# Patient Record
Sex: FEMALE | Race: WHITE
[De-identification: names, ages, dates, MRNs, and addresses within clinical notes are randomized per-mention and may not be internally consistent; named-entity substitution may affect disease eponyms.]

---

## 2017-07-26 ENCOUNTER — HOSPITAL ENCOUNTER (INPATIENT)
Dept: HOSPITAL 56 - MW.ED | Age: 52
LOS: 3 days | Discharge: HOME | DRG: 383 | End: 2017-07-29
Attending: INTERNAL MEDICINE | Admitting: INTERNAL MEDICINE
Payer: COMMERCIAL

## 2017-07-26 DIAGNOSIS — Z79.899: ICD-10-CM

## 2017-07-26 DIAGNOSIS — Z91.040: ICD-10-CM

## 2017-07-26 DIAGNOSIS — M06.9: ICD-10-CM

## 2017-07-26 DIAGNOSIS — M79.7: ICD-10-CM

## 2017-07-26 DIAGNOSIS — M20.11: ICD-10-CM

## 2017-07-26 DIAGNOSIS — I10: ICD-10-CM

## 2017-07-26 DIAGNOSIS — L03.116: Primary | ICD-10-CM

## 2017-07-26 DIAGNOSIS — Z88.8: ICD-10-CM

## 2017-07-26 DIAGNOSIS — Z72.0: ICD-10-CM

## 2017-07-26 DIAGNOSIS — M20.12: ICD-10-CM

## 2017-07-26 LAB
CHLORIDE SERPL-SCNC: 102 MMOL/L (ref 98–110)
SODIUM SERPL-SCNC: 138 MMOL/L (ref 136–146)

## 2017-07-26 PROCEDURE — A9577 INJ MULTIHANCE: HCPCS

## 2017-07-26 NOTE — CR
EXAMINATION: Left foot

 

HISTORY: Swelling

 

COMPARISON: 9/4/2012

 

TECHNIQUE: 2 views

 

FINDINGS: There is no acute osseous abnormality, dislocation, or fracture. There is an old healed se
cond metatarsal fracture identified. Moderate hallux valgus is noted. Tiny plantar calcaneal spur. P
unctate densities project over subcutaneous mild dorsal soft tissue swelling also noted overlying th
e forefoot. Plantar surface of the heel.

 

IMPRESSION: 

1. No acute osseous abdomen identified. Soft tissue swelling noted over the forefoot.

2. Hallux valgus.

## 2017-07-26 NOTE — PCM.HP
H&P History of Present Illness





- General


Date of Service: 07/26/17


Admit Problem/Dx: 


L foot cellulitis








- History of Present Illness


Initial Comments - Free Text/Narative: 





This 51 year old female with pmh of RA, HTN, and severe bunions to bilateral 

feet presented to the ED today with concerns of erythema to L foot which has 

worsened after being on both Keflex and Bactrim. She reports around July 7th, 

she started having some troubles with open skin to her bunions and callouses. 

She was seen by Betsy Beyer NP at [place] at that time and was placed on 

Keflex, nothing changed and she saw Betsy again on this last Monday. Podiatry 

was in the clinic at this time and was able to see her. Dr. Dixon debrided 

both bunions and callouses to bilateral feet. He then placed her on Bactrim 

BID. She is on day 3 of this and is noticing worsening redness and pain to L 

foot. She was urged to be seen in the ED due to failed outpatient treatment for 

cellulitis. She denies fevers or chills at home, no chest pain, SOB or 

palpitations. Some nausea with taking Bactrim. No abdominal pain, urinary 

symptoms or diarrhea.





In the ED no leukocytosis was noted. BMP WNL. ESR elevated at 57. L foot xray 

reveals soft tissue swelling over L forefoot. No acute osseous abnormality 

noted. Moderate hallux valgus noted. BC obtained and pending. She was treated 

with Vancomycin. She will be admitted for failed outpatient management of L 

foot cellulitis





PCP, Betsy Beyer NP





  ** Bilateral Feet


Pain Score (Numeric/FACES): 7





- Related Data


Allergies/Adverse Reactions: 


 Allergies











Allergy/AdvReac Type Severity Reaction Status Date / Time


 


erythromycin base Allergy  Hives Verified 07/26/17 11:57


 


latex Allergy  Rash Verified 07/26/17 11:57


 


Clear Medical Tape Allergy  Rash Uncoded 07/26/17 11:57











Home Medications: 


 Home Meds





Celecoxib [CeleBREX] 200 mg PO DAILY 01/14/17 [History]


Cyclobenzaprine [Flexeril] 20 mg PO BID 01/14/17 [History]


Omeprazole 20 mg PO TID 01/14/17 [History]


Pregabalin [Lyrica] 200 mg PO TID 01/14/17 [History]


amLODIPine [Norvasc] 5 mg PO DAILY 01/14/17 [History]


Sulfamethoxazole/Trimethoprim [Septra DS] 800 mg PO BID 07/26/17 [History]











Past Medical History


HEENT History: Reports: None


Cardiovascular History: Reports: Hypertension, Other (See Below) (pericardial 

effusion, which was drained approx 2009.).  Denies: Afib, Blood Clots/VTE/DVT, 

MI


Respiratory History: Reports: None.  Denies: COPD, SOB


Gastrointestinal History: Reports: GERD.  Denies: GI Bleed


Genitourinary History: Reports: None.  Denies: Acute Renal Failure, Chronic 

Renal Insuffiency


OB/GYN History: Reports: None


Musculoskeletal History: Reports: Fibromyalgia, RA


Neurological History: Reports: None


Psychiatric History: Reports: None


Endocrine/Metabolic History: Reports: None


Hematologic History: Reports: None


Immunologic History: Reports: None


Oncologic (Cancer) History: Reports: None


Dermatologic History: Reports: None





- Infectious Disease History


Infectious Disease History: Reports: Chicken Pox





- Past Surgical History


Neurological Surgical History: Reports: Other (See Below)


Musculoskeletal Surgical History: Reports: Other (See Below)





Social & Family History





- Family History


Family Medical History: Noncontributory





- Tobacco Use


Smoking Status *Q: Current Every Day Smoker


Years of Tobacco use: 20


Packs/Tins Daily: 1


Used Tobacco, but Quit: No


Second Hand Smoke Exposure: No





- Caffeine Use


Caffeine Use: Reports: Coffee





- Alcohol Use


Number of Drinks Per Day: 3


Alcohol Use Frequency: Daily





- Recreational Drug Use


Recreational Drug Use: No





- Living Situation & Occupation


Living situation: Reports: 


Occupation: Employed





H&P Review of Systems





- Review of Systems:


Review Of Systems: See Below


General: Reports: No Symptoms.  Denies: Fever, Chills, Malaise


HEENT: Reports: No Symptoms.  Denies: Headaches, Sinus Congestion, Sore Throat


Pulmonary: Reports: No Symptoms.  Denies: Shortness of Breath, Cough, Sputum


Cardiovascular: Reports: Edema (bilateral feet).  Denies: Chest Pain, 

Palpitations


Gastrointestinal: Reports: Nausea (with Bactrim dosing).  Denies: Abdominal Pain

, Black Stool, Bloody Stool, Vomiting


Genitourinary: Reports: No Symptoms.  Denies: Dysuria, Frequency, Burning


Musculoskeletal: Reports: Foot Pain (L & R feet and bunion pain)


Skin: Reports: Erythema (L foot), Wound


Psychiatric: Reports: No Symptoms


Neurological: Reports: No Symptoms





Exam





- Exam


Exam: See Below





- Vital Signs


Vital Signs: 


 Last Vital Signs











Temp  96.3 F   07/26/17 12:00


 


Pulse  96   07/26/17 12:00


 


Resp  18   07/26/17 12:00


 


BP  132/83   07/26/17 12:00


 


Pulse Ox  98   07/26/17 12:00











Weight: 86.3 kg





- Exam


General: Alert, Oriented, Cooperative


HEENT: Conjunctiva Clear, Hearing Intact, Mucosa Moist & Pink, Posterior 

Pharynx Clear, Pupils Equal, Pupils Reactive, PERRLA


Neck: Supple, Trachea Midline, 2


Lungs: Clear to Auscultation, Normal Respiratory Effort


Cardiovascular: Regular Rate, Regular Rhythm, Normal S1, Normal S2


GI/Abdominal Exam: Normal Bowel Sounds, Soft, Non-Tender, No Organomegaly, No 

Distention, No Abnormal Bruit, No Mass, Pelvis Stable


Extremities: Normal Range of Motion, Normal Capillary Refill, Pedal Edema, 

Joint Swelling, Other (bunions noted to both L and R feet, with open skit to 

both. No purulent drainage, both wound bend clean. Some dry flaky skin noted 

surround these areas andfurther callouses. Erythem extends from L bunion up to 

dorsum and medial malleous to ventral shin. Warm and tenderness noted to any 

palpation. No fluctuance noted.)


Skin: Wound (See above description)


Neurological: Cranial Nerves Intact


Neuro Extensive - Mental Status: Alert, Oriented x3, Normal Mood/Affect, Normal 

Cognition, Memory Intact


Psychiatric: Alert, Normal Affect, Normal Mood





- Patient Data


Result Diagrams: 


 07/26/17 12:18





 07/26/17 12:18





*Q Meaningful Use (ADM)





- VTE *Q


VTE Criteria *Q: 








- VTE Risk Assess *Q


Each Risk Factor Represents 1 Point: Age 41 - 59 years, Swollen Legs, Current


Total Score 1 Point Risk Factors: 2


Each Risk Factor Represents 2 Points: None


Total Score 2 Point Risk Factors: 0


Each Risk Factor Represents 3 Points: None


Total Score 3 Point Risk Factors: 0


Each Risk Factor Represents 5 Points: None


Total Score 5 Point Risk Factors: 0


Venous Thromboembolism Risk Factor Score *Q: 2





- Stroke *Q


Stroke Criteria *Q: 








- AMI *Q


AMI Criteria *Q: 








- Problem List


(1) Cellulitis


SNOMED Code(s): 375938937


   ICD Code: L03.90 - CELLULITIS, UNSPECIFIED   Status: Acute   Current Visit: 

Yes   


Qualifiers: 


   Site of cellulitis: extremity   Site of cellulitis of extremity: lower 

extremity   Laterality: left   Qualified Code(s): L03.116 - Cellulitis of left 

lower limb   





(2) RA (rheumatoid arthritis)


SNOMED Code(s): 71130276


   ICD Code: M06.9 - RHEUMATOID ARTHRITIS, UNSPECIFIED   Status: Chronic   

Current Visit: Yes   


Qualifiers: 


   Rheumatoid arthritis location: wrist   Laterality: bilateral 





(3) Fibromyalgia


SNOMED Code(s): 494876638


   ICD Code: M79.7 - FIBROMYALGIA   Status: Chronic   Current Visit: Yes   





(4) Hallux valgus (acquired), left foot


SNOMED Code(s): 18713554


   ICD Code: M20.12 - HALLUX VALGUS (ACQUIRED), LEFT FOOT   Status: Chronic   

Current Visit: Yes   





(5) Hallux valgus (acquired), right foot


SNOMED Code(s): 82848785


   ICD Code: M20.11 - HALLUX VALGUS (ACQUIRED), RIGHT FOOT   Status: Chronic   

Current Visit: Yes   





(6) HTN (hypertension)


SNOMED Code(s): 88482455


   ICD Code: I10 - ESSENTIAL (PRIMARY) HYPERTENSION   Status: Chronic   Current 

Visit: Yes   


Qualifiers: 


   Hypertension type: essential hypertension   Qualified Code(s): I10 - 

Essential (primary) hypertension   





(7) Nicotine abuse


SNOMED Code(s): 76852270


   ICD Code: Z72.0 - TOBACCO USE   Status: Chronic   Current Visit: Yes   


Problem List Initiated/Reviewed/Updated: Yes


Orders Last 24hrs: 


 Active Orders 24 hr











 Category Date Time Status


 


 Ambulate [RC] ASDIRECTED Care  07/26/17 13:48 Ordered


 


 Elevate Extremity [RC] BID Care  07/26/17 13:48 Ordered


 


 Oxygen Therapy [RC] PRN Care  07/26/17 13:48 Ordered


 


 VTE/DVT Education [RC] PER UNIT ROUTINE Care  07/26/17 13:48 Ordered


 


 Vital Signs [RC] Q4H Care  07/26/17 13:48 Ordered


 


 Wound Care [RC] DAILY Care  07/26/17 13:48 Ordered


 


 Regular Diet [DIET] Diet  07/26/17 Dinner Ordered


 


 BASIC METABOLIC PANEL,BMP [CHEM] AM Lab  07/27/17 05:11 Ordered


 


 BASIC METABOLIC PANEL,BMP [CHEM] AM Lab  07/28/17 05:11 Ordered


 


 BASIC METABOLIC PANEL,BMP [CHEM] AM Lab  07/29/17 05:11 Ordered


 


 CBC WITH AUTO DIFF [HEME] AM Lab  07/27/17 05:11 Ordered


 


 CBC WITH AUTO DIFF [HEME] AM Lab  07/28/17 05:11 Ordered


 


 CBC WITH AUTO DIFF [HEME] AM Lab  07/29/17 05:11 Ordered


 


 Acetaminophen [Tylenol] Med  07/26/17 13:48 Ordered





 650 mg PO Q4H PRN   


 


 Cyclobenzaprine [Flexeril] Med  07/26/17 21:00 Ordered





 20 mg PO BID   


 


 Enoxaparin [Lovenox] Med  07/26/17 14:00 Ordered





 40 mg SUBCUT DAILY   


 


 Omeprazole [Omeprazole] Med  07/26/17 14:00 Ordered





 20 mg PO TID   


 


 Ondansetron [Zofran] Med  07/26/17 13:48 Ordered





 4 mg IVPUSH Q4H PRN   


 


 Pregabalin [Lyrica] Med  07/26/17 14:00 Ordered





 200 mg PO TID   


 


 Vancomycin Pharmacy to Dose [Pharmacy to Dose - Med  07/26/17 14:00 Ordered





 Vancomycin]   





 1 dose .XX ASDIRECTED   


 


 amLODIPine [Norvasc] Med  07/27/17 09:00 Ordered





 5 mg PO DAILY   


 


 oxyCODONE Med  07/26/17 13:48 Ordered





 5 mg PO Q4H PRN   


 


 Resuscitation Status Routine Resus Stat  07/26/17 13:48 Ordered








 Medication Orders





Sodium Chloride (Normal Saline)  1,000 mls @ 125 mls/hr IV STAT PILO


   Last Admin: 07/26/17 12:38  Dose: 125 mls/hr








Assessment/Plan Comment:: 


This 51 year old female admitted with L foot cellulitis, which failed 

outpatient treatment





1. Cellulitis: Will place on Vancomycin. Continue betadine cleanse and gauze 

wraps per Podiatry. Monitor closely. ESR elevated. No drainage. BC pending. 

Will order Oxycodone for pain PRN





2. Open wounds: Continue betadine and gauze wrap to bilateral bunions as per 

Podiatry. 





3. HTN: Continue Norvasc





4. Fibromyalgia: Continue Flexeril and Gabapentin





VTE prophylaxis: Lovenox





Dispo: 2-3 days.

## 2017-07-26 NOTE — EDM.PDOC
ED HPI GENERAL MEDICAL PROBLEM





- General


Chief Complaint: Skin Complaint


Stated Complaint: INFECTION IN FEET


Time Seen by Provider: 07/26/17 12:01


Source of Information: Reports: Patient





- History of Present Illness


INITIAL COMMENTS - FREE TEXT/NARRATIVE: 





HISTORY AND PHYSICAL:


History of present illness:


Patient presents from [facility]  or nurse practitioner Kayleen, 

she is seeing podiatry through [facility] as well for diabetic foot 

ulcer on her left foot, she has been on Bactrim day 3 of 10 with improvement of 

her diabetic foot ulcer however she has expanding cellulitis failing oral 

antibiotics





Patient course started on July 7, she has had several visits with podiatry as 

well as Kayleen, is initially on Keflex, currently on Bactrim with worsening of 

symptoms





No fever nausea vomiting chills sweats














Review of systems: 


As per history of present illness and below otherwise all systems reviewed and 

negative.


Past medical history: 


As per history of present illness and as reviewed below otherwise 

noncontributory.


Surgical history: 


As per history of present illness and as reviewed below otherwise 

noncontributory.


Social history: 


No reported history of drug or alcohol abuse.


Family history: 


As per history of present illness and as reviewed below otherwise 

noncontributory.








Physical exam:


HEENT: Atraumatic, normocephalic, pupils reactive, negative for conjunctival 

pallor or scleral icterus, mucous membranes moist, throat clear, neck supple, 

nontender, trachea midline.


Lungs: Clear to auscultation, breath sounds equal bilaterally, chest nontender.


Heart: S1S2, regular, negative for clicks, rubs, or JVD.


Abdomen: Soft, nondistended, nontender. Negative for masses or 

hepatosplenomegaly. Negative for costovertebral tenderness.


Pelvis: Stable nontender.


Genitourinary: Deferred.


Rectal: Deferred.


Extremities: Atraumatic, negative for cords or calf pain. Neurovascular 

unremarkable.


Neuro: Awake, alert, oriented. Cranial nerves II through XII unremarkable. 

Cerebellum unremarkable. Motor and sensory unremarkable throughout. Exam 

nonfocal.


Left foot redness warmth and tenderness over the entire dorsum of the foot 

extending medially just over the ankle, healing diabetic foot ulcer noted no 

exudate for culture








Diagnostics:


[]CBC, CMP, UA


Blood culture 2





Therapeutics:


[]Vancomycin 1 g IV








Impression: 


[]Diabetic foot ulcer


Cellulitis left foot


Failed outpatient antibiotics








Definitive disposition and diagnosis as appropriate pending reevaluation and 

review of above.


  ** Bilateral Feet


Pain Score (Numeric/FACES): 7





- Related Data


 Allergies











Allergy/AdvReac Type Severity Reaction Status Date / Time


 


erythromycin base Allergy  Hives Verified 07/26/17 11:57


 


latex Allergy  Rash Verified 07/26/17 11:57


 


Clear Medical Tape Allergy  Rash Uncoded 07/26/17 11:57











Home Meds: 


 Home Meds





Celecoxib [CeleBREX] 200 mg PO DAILY 01/14/17 [History]


Cyclobenzaprine [Flexeril] 20 mg PO BID 01/14/17 [History]


Omeprazole 20 mg PO TID 01/14/17 [History]


Pregabalin [Lyrica] 200 mg PO TID 01/14/17 [History]


amLODIPine [Norvasc] 5 mg PO DAILY 01/14/17 [History]


Sulfamethoxazole/Trimethoprim [Septra DS] 800 mg PO BID 07/26/17 [History]











Past Medical History


HEENT History: Reports: None


Cardiovascular History: Reports: Hypertension


Respiratory History: Reports: None


Gastrointestinal History: Reports: None


Genitourinary History: Reports: None


OB/GYN History: Reports: None


Musculoskeletal History: Reports: Fibromyalgia, RA


Neurological History: Reports: None


Psychiatric History: Reports: None


Endocrine/Metabolic History: Reports: None


Hematologic History: Reports: None


Immunologic History: Reports: None


Oncologic (Cancer) History: Reports: None


Dermatologic History: Reports: None





- Infectious Disease History


Infectious Disease History: Reports: Chicken Pox





- Past Surgical History


Neurological Surgical History: Reports: Other (See Below)


Musculoskeletal Surgical History: Reports: Other (See Below)





Social & Family History





- Family History


Family Medical History: Noncontributory





- Tobacco Use


Smoking Status *Q: Current Every Day Smoker


Years of Tobacco use: 20


Packs/Tins Daily: 1





- Caffeine Use


Caffeine Use: Reports: Coffee





- Recreational Drug Use


Recreational Drug Use: No





ED ROS GENERAL





- Review of Systems


Review Of Systems: ROS reveals no pertinent complaints other than HPI.





ED EXAM, SKIN/RASH


Exam: See Below





Course





- Vital Signs


Last Recorded V/S: 


 Last Vital Signs











Temp  35.7 C   07/26/17 12:00


 


Pulse  96   07/26/17 12:00


 


Resp  18   07/26/17 12:00


 


BP  132/83   07/26/17 12:00


 


Pulse Ox  98   07/26/17 12:00














- Orders/Labs/Meds


Orders: 


 Active Orders 24 hr











 Category Date Time Status


 


 Foot 2V Rt [CR] Stat Exams  07/26/17 13:01 Ordered


 


 CULTURE BLOOD [BC] Stat Lab  07/26/17 12:18 Received


 


 CULTURE BLOOD [BC] Stat Lab  07/26/17 12:35 Received


 


 SEDIMENTATION RATE AUTO [HEME] Stat Lab  07/26/17 12:18 Received


 


 UA W/MICROSCOPIC [URIN] Stat Lab  07/26/17 12:00 Uncollected


 


 Sodium Chloride 0.9% [Normal Saline] 1,000 ml Med  07/26/17 12:15 Active





 IV STAT   


 


 Blood Culture x2 Reflex Set [OM.PC] Stat Oth  07/26/17 12:10 Ordered








 Medication Orders





Sodium Chloride (Normal Saline)  1,000 mls @ 125 mls/hr IV STAT PILO


   Last Admin: 07/26/17 12:38  Dose: 125 mls/hr








Labs: 


 Laboratory Tests











  07/26/17 07/26/17 Range/Units





  12:18 12:18 


 


WBC  6.34   (4.0-11.0)  K/uL


 


RBC  4.20 L   (4.30-5.90)  M/uL


 


Hgb  13.5   (12.0-16.0)  g/dL


 


Hct  39.9   (36.0-46.0)  %


 


MCV  95.0   (80.0-98.0)  fL


 


MCH  32.1 H   (27.0-32.0)  pg


 


MCHC  33.8   (31.0-37.0)  g/dL


 


RDW Std Deviation  48.6   (28.0-62.0)  fl


 


RDW Coeff of Inocente  14   (11.0-15.0)  %


 


Plt Count  211   (150-400)  K/uL


 


MPV  10.30   (7.40-12.00)  fL


 


Neut % (Auto)  65.8   (48.0-80.0)  %


 


Lymph % (Auto)  25.1   (16.0-40.0)  %


 


Mono % (Auto)  6.3   (0.0-15.0)  %


 


Eos % (Auto)  2.5   (0.0-7.0)  %


 


Baso % (Auto)  0.3   (0.0-1.5)  %


 


Neut # (Auto)  4.2   (1.4-5.7)  K/uL


 


Lymph # (Auto)  1.6   (0.6-2.4)  K/uL


 


Mono # (Auto)  0.4   (0.0-0.8)  K/uL


 


Eos # (Auto)  0.2   (0.0-0.7)  K/uL


 


Baso # (Auto)  0.0   (0.0-0.1)  K/uL


 


Nucleated RBC %  0.0   /100WBC


 


Nucleated RBCs #  0   K/uL


 


Sodium   138  (136-146)  mmol/L


 


Potassium   4.0  (3.5-5.1)  mmol/L


 


Chloride   102  ()  mmol/L


 


Carbon Dioxide   25  (21-31)  mmol/L


 


BUN   15  (6.0-23.0)  mg/dL


 


Creatinine   0.9  (0.6-1.5)  mg/dL


 


Est Cr Clr Drug Dosing   70.30  mL/min


 


Estimated GFR (MDRD)   > 60.0  ml/min


 


Glucose   93  ()  mg/dL


 


Calcium   9.6  (8.8-10.8)  mg/dL


 


Total Bilirubin   0.5  (0.1-1.5)  mg/dL


 


AST   29  (5-40)  IU/L


 


ALT   30  (8-54)  IU/L


 


Alkaline Phosphatase   88  ()  


 


Total Protein   8.1 H  (6.0-8.0)  g/dL


 


Albumin   4.2  (3.5-5.0)  g/dL


 


Globulin   3.9 H  (2.0-3.5)  g/dL


 


Albumin/Globulin Ratio   1.1 L  (1.3-2.8)  











Meds: 


Medications











Generic Name Dose Route Start Last Admin





  Trade Name Freq  PRN Reason Stop Dose Admin


 


Sodium Chloride  1,000 mls @ 125 mls/hr  07/26/17 12:15  07/26/17 12:38





  Normal Saline  IV   125 mls/hr





  STAT PILO   Administration














Discontinued Medications














Generic Name Dose Route Start Last Admin





  Trade Name Freq  PRN Reason Stop Dose Admin


 


Vancomycin HCl 1 gm/ Sodium  250 mls @ 250 mls/hr  07/26/17 12:11  07/26/17 12:

39





  Chloride  IV  07/26/17 13:10  250 mls/hr





  ONETIME ONE   Administration














Departure





- Departure


Time of Disposition: 13:13


Disposition: Admitted As Inpatient 64


Condition: Fair


Clinical Impression: 


 Cellulitis, Diabetic foot ulcer








- Discharge Information


Forms:  ED Department Discharge





- My Orders


Last 24 Hours: 


My Active Orders





07/26/17 12:00


UA W/MICROSCOPIC [URIN] Stat 





07/26/17 12:10


Blood Culture x2 Reflex Set [OM.PC] Stat 





07/26/17 12:15


Sodium Chloride 0.9% [Normal Saline] 1,000 ml IV STAT 





07/26/17 12:18


CULTURE BLOOD [BC] Stat 


SEDIMENTATION RATE AUTO [HEME] Stat 





07/26/17 12:35


CULTURE BLOOD [BC] Stat 





07/26/17 13:01


Foot 2V Rt [CR] Stat 














- Assessment/Plan


Last 24 Hours: 


My Active Orders





07/26/17 12:00


UA W/MICROSCOPIC [URIN] Stat 





07/26/17 12:10


Blood Culture x2 Reflex Set [OM.PC] Stat 





07/26/17 12:15


Sodium Chloride 0.9% [Normal Saline] 1,000 ml IV STAT 





07/26/17 12:18


CULTURE BLOOD [BC] Stat 


SEDIMENTATION RATE AUTO [HEME] Stat 





07/26/17 12:35


CULTURE BLOOD [BC] Stat 





07/26/17 13:01


Foot 2V Rt [CR] Stat

## 2017-07-27 LAB
CHLORIDE SERPL-SCNC: 107 MMOL/L (ref 98–110)
SODIUM SERPL-SCNC: 140 MMOL/L (ref 136–146)

## 2017-07-27 NOTE — MR
EXAM DATE: 17



PATIENT'S AGE: 51





Patient: SHOBHA MOORE



Facility: [place], [place]

Patient ID: [id number]

: 1965

Study: MRI Extremity Left [id number]/26/2017 8:47:56 PM

Ordering Physician: Varsha Augustin



Final Report: 

HISTORY:

Left foot cellulitis. Evaluate for osteomyelitis.



Technique:

Axial, sagittal and coronal T1, proton density, proton density fat-sat, STIR 
and post contrast T1 weighted images with fat saturation were obtained of the 
left foot.



Comparison:

No prior.



Findings:

Tendons: Small amount of fluid within the posterior tibial tendon sheath. The 
tendon itself is intact. The flexor digitorum longus and flexor hallucis longus 
tendons are intact. There is a small amount of fluid within the extensor 
digitorum longus tendon sheath suggesting mild tenosynovitis. The anterior 
extensor tendons appear otherwise intact. Areas longus and brevis tendons are 
intact. The distal Achilles tendon is intact.

-

Ligaments: The anterior and posterior syndesmotic ligaments are intact. Deltoid 
ligament is intact. Lateral ankle ligamentous structures are intact. Sinus 
tarsi fat is maintained. Calcaneonavicular spring ligament intact.

-

Joint spaces: The ankle and subtalar joint spaces are maintained. Talonavicular 
and calcaneocuboid articulations are maintained. Joint spaces within the 
midfoot and at the midfoot-forefoot junction are maintained. Degenerative 
changes of the 1st metatarsophalangeal joint. The 2nd through 5th 
metatarsophalangeal joints are maintained. 

-

Bones and soft tissues: There is subcutaneous signal abnormality enhancement 
involving the distal lower leg and foot compatible with cellulitis. No 
localized fluid collection to suggest a discrete soft tissue abscess. No 
definite osteomyelitis. A bipartite medial sesamoid bone is present with the 
marrow edema present within the sesamoid. There is no acute fracture. Small 
plantar calcaneal spur. Mild more chronic appearing thickening of the proximal 
most central band of the plantar fascia.



Impression:

1. Cellulitis of the left lower leg and foot. No localized fluid collection or 
osteomyelitis.

2. Degenerative changes of the 1st metatarsophalangeal joint.

3. Bipartite medial sesamoid bone with marrow edema within both moieties.

4. Extensor digitorum longus tendon sheath tenosynovitis. The tendons appear 
otherwise intact.

5. Ankle joint space and ligaments are intact.

6. Small plantar calcaneal spur with mild chronic thickening of the proximal 
central band of the plantar fascia.





Dictated by Sahil Carty MD @ 2017 7:59AM

(Electronic Signature)



Report Signed by Proxy.
ALEXANDRA

## 2017-07-27 NOTE — PCM.PN
- General Info


Date of Service: 07/27/17


Admission Dx/Problem (Free Text): 


L foot cellulitis





Subjective Update: 


Continues to have throbbing pain to bilateral botttoms of feet. Denies chest 

pain or SOB. erythema improving.





Functional Status: Reports: Pain Controlled, Tolerating Diet, Ambulating, 

Urinating





- Review of Systems


General: Reports: No Symptoms.  Denies: Fever


HEENT: Reports: No Symptoms


Pulmonary: Reports: No Symptoms.  Denies: Shortness of Breath


Cardiovascular: Reports: No Symptoms.  Denies: Chest Pain


Gastrointestinal: Reports: No Symptoms.  Denies: Abdominal Pain, Nausea, 

Vomiting


Genitourinary: Reports: No Symptoms.  Denies: Dysuria, Frequency, Burning, Pain


Musculoskeletal: Reports: No Symptoms


Skin: Reports: No Symptoms


Neurological: Reports: No Symptoms


Psychiatric: Reports: No Symptoms





- Patient Data


Vitals - Most Recent: 


 Last Vital Signs











Temp  97.1 F   07/27/17 08:00


 


Pulse  76   07/27/17 08:00


 


Resp  22 H  07/27/17 08:00


 


BP  99/62   07/27/17 08:00


 


Pulse Ox  93 L  07/27/17 08:00











Weight - Most Recent: 86.3 kg


I&O - Last 24 Hours: 


 Intake & Output











 07/26/17 07/27/17 07/27/17





 22:59 06:59 14:59


 


Intake Total 1000 2400 


 


Output Total  2500 


 


Balance 1000 -100 











Lab Results Last 24 Hours: 


 Laboratory Results - last 24 hr











  07/26/17 07/27/17 07/27/17 Range/Units





  15:40 04:40 04:40 


 


WBC   4.34   (4.0-11.0)  K/uL


 


RBC   3.96 L   (4.30-5.90)  M/uL


 


Hgb   12.5   (12.0-16.0)  g/dL


 


Hct   38.3   (36.0-46.0)  %


 


MCV   96.7   (80.0-98.0)  fL


 


MCH   31.6   (27.0-32.0)  pg


 


MCHC   32.6   (31.0-37.0)  g/dL


 


RDW Std Deviation   50.0   (28.0-62.0)  fl


 


RDW Coeff of Inocente   14   (11.0-15.0)  %


 


Plt Count   192   (150-400)  K/uL


 


MPV   9.80   (7.40-12.00)  fL


 


Neut % (Auto)   51.0   (48.0-80.0)  %


 


Lymph % (Auto)   34.3   (16.0-40.0)  %


 


Mono % (Auto)   9.9   (0.0-15.0)  %


 


Eos % (Auto)   4.1   (0.0-7.0)  %


 


Baso % (Auto)   0.7   (0.0-1.5)  %


 


Neut # (Auto)   2.2   (1.4-5.7)  K/uL


 


Lymph # (Auto)   1.5   (0.6-2.4)  K/uL


 


Mono # (Auto)   0.4   (0.0-0.8)  K/uL


 


Eos # (Auto)   0.2   (0.0-0.7)  K/uL


 


Baso # (Auto)   0.0   (0.0-0.1)  K/uL


 


Nucleated RBC %   0.0   /100WBC


 


Nucleated RBCs #   0   K/uL


 


Sodium    140  (136-146)  mmol/L


 


Potassium    4.4  (3.5-5.1)  mmol/L


 


Chloride    107  ()  mmol/L


 


Carbon Dioxide    25  (21-31)  mmol/L


 


BUN    15  (6.0-23.0)  mg/dL


 


Creatinine    0.8  (0.6-1.5)  mg/dL


 


Est Cr Clr Drug Dosing    79.08  mL/min


 


Estimated GFR (MDRD)    > 60.0  ml/min


 


Glucose    86  ()  mg/dL


 


Calcium    8.9  (8.8-10.8)  mg/dL


 


Urine Color  YELLOW    


 


Urine Appearance  CLEAR    


 


Urine pH  6.5    (5.0-8.0)  


 


Ur Specific Gravity  <= 1.005    (1.001-1.035)  


 


Urine Protein  NEGATIVE    (NEGATIVE)  mg/dL


 


Urine Glucose (UA)  NEGATIVE    (NEGATIVE)  mg/dL


 


Urine Ketones  NEGATIVE    (NEGATIVE)  mg/dL


 


Urine Occult Blood  NEGATIVE    (NEGATIVE)  


 


Urine Nitrite  NEGATIVE    (NEGATIVE)  


 


Urine Bilirubin  NEGATIVE    (NEGATIVE)  


 


Urine Urobilinogen  0.2    (<2.0)  EU/dL


 


Ur Leukocyte Esterase  NEGATIVE    (NEGATIVE)  


 


Urine RBC  0-1    (0-2/HPF)  


 


Urine WBC  0-1    (0-5/HPF)  


 


Ur Epithelial Cells  RARE    (NONE-FEW)  


 


Urine Bacteria  RARE    (NEGATIVE)  











Med Orders - Current: 


 Current Medications





Acetaminophen (Tylenol)  650 mg PO Q4H PRN


   PRN Reason: Pain


Amlodipine Besylate (Norvasc)  5 mg PO DAILY [facility]


Cyclobenzaprine HCl (Flexeril)  20 mg PO BID [facility]


   Last Admin: 07/26/17 21:09 Dose:  20 mg


Docusate Sodium (Colace)  100 mg PO DAILY [facility]


   Last Admin: 07/26/17 15:08 Dose:  100 mg


Enoxaparin Sodium (Lovenox)  40 mg SUBCUT DAILY [facility]


   Last Admin: 07/26/17 14:22 Dose:  40 mg


Vancomycin HCl 1,250 mg/ (Sodium Chloride)  250 mls @ 250 mls/hr IV Q12H [facility]


   Last Admin: 07/26/17 23:15 Dose:  250 mls/hr


Morphine Sulfate (Morphine)  3 mg IVPUSH Q4H PRN


   PRN Reason: Pain


   Last Admin: 07/27/17 04:09 Dose:  3 mg


Nicotine (Habitrol)  14 mg TRDERM DAILY [facility]


   Last Admin: 07/26/17 14:22 Dose:  14 mg


Ondansetron HCl (Zofran)  4 mg IVPUSH Q4H PRN


   PRN Reason: Nausea


Oxycodone HCl (Oxycodone)  5 - 10 mg PO Q4H PRN


   PRN Reason: Pain (moderate 4-6)


   Last Admin: 07/27/17 08:04 Dose:  10 mg


Pantoprazole Sodium (Protonix***)  40 mg PO BIDAC [facility]


   Last Admin: 07/27/17 06:31 Dose:  40 mg


Polyethylene Glycol (Miralax)  17 gm PO DAILY [facility]


   Last Admin: 07/26/17 15:08 Dose:  17 gm


Pregabalin (Lyrica)  200 mg PO TID [facility]


   Last Admin: 07/27/17 06:31 Dose:  200 mg


Vancomycin HCl (Pharmacy To Dose - Vancomycin)  1 dose .XX ASDIRECTED [facility]





Discontinued Medications





Gadobenate Dimeglumine (Multihance)  20 ml IVPUSH ONETIME STA


   Stop: 07/26/17 19:37


   Last Admin: 07/26/17 19:37 Dose:  16 ml


Sodium Chloride (Normal Saline)  1,000 mls @ 125 mls/hr IV STAT [facility]


   Last Admin: 07/26/17 12:38 Dose:  125 mls/hr


Vancomycin HCl 1 gm/ Sodium (Chloride)  250 mls @ 250 mls/hr IV ONETIME ONE


   Stop: 07/26/17 13:10


   Last Admin: 07/26/17 12:39 Dose:  250 mls/hr


Vancomycin HCl 1,500 mg/ (Sodium Chloride)  500 mls @ 333.333 mls/hr IV Q12H [facility]


Vancomycin HCl 1,250 mg/ (Sodium Chloride)  250 mls @ 250 mls/hr IV Q12H [facility]


Omeprazole (Omeprazole)  20 mg PO TID [facility]


   Last Admin: 07/26/17 14:24 Dose:  20 mg


Oxycodone HCl (Oxycodone)  5 mg PO Q4H PRN


   PRN Reason: Pain (moderate 4-6)


   Last Admin: 07/26/17 14:23 Dose:  5 mg











- Exam


General: Alert, Oriented, Cooperative, No Acute Distress


Neck: Supple


Lungs: Clear to Auscultation, Normal Respiratory Effort


Cardiovascular: Regular Rate, Regular Rhythm


Extremities: Normal Inspection, Normal Capillary Refill, Pedal Edema (improving 

with elevation of limbs)


Wound/Incisions: Erythema Improving (to L medial ankle and dorsum of foot. Open 

fissures noted to bilateral bunion and callouses. no drainage, )


Psy/Mental Status: Alert, Normal Affect, Normal Mood





- Problem List & Annotations


(1) Cellulitis


SNOMED Code(s): 178689763


   Code(s): L03.90 - CELLULITIS, UNSPECIFIED   Status: Acute   Current Visit: 

Yes   


Qualifiers: 


   Site of cellulitis: extremity   Site of cellulitis of extremity: lower 

extremity   Laterality: left   Qualified Code(s): L03.116 - Cellulitis of left 

lower limb   





(2) RA (rheumatoid arthritis)


SNOMED Code(s): 90730645


   Code(s): M06.9 - RHEUMATOID ARTHRITIS, UNSPECIFIED   Status: Chronic   

Current Visit: Yes   


Qualifiers: 


   Rheumatoid arthritis location: wrist   Laterality: bilateral 





(3) Fibromyalgia


SNOMED Code(s): 424916507


   Code(s): M79.7 - FIBROMYALGIA   Status: Chronic   Current Visit: Yes   





(4) Hallux valgus (acquired), left foot


SNOMED Code(s): 30879464


   Code(s): M20.12 - HALLUX VALGUS (ACQUIRED), LEFT FOOT   Status: Chronic   

Current Visit: Yes   





(5) Hallux valgus (acquired), right foot


SNOMED Code(s): 67434099


   Code(s): M20.11 - HALLUX VALGUS (ACQUIRED), RIGHT FOOT   Status: Chronic   

Current Visit: Yes   





(6) HTN (hypertension)


SNOMED Code(s): 11657031


   Code(s): I10 - ESSENTIAL (PRIMARY) HYPERTENSION   Status: Chronic   Current 

Visit: Yes   


Qualifiers: 


   Hypertension type: essential hypertension   Qualified Code(s): I10 - 

Essential (primary) hypertension   





(7) Nicotine abuse


SNOMED Code(s): 62953046


   Code(s): Z72.0 - TOBACCO USE   Status: Chronic   Current Visit: Yes   





- Problem List Review


Problem List Initiated/Reviewed/Updated: Yes





- My Orders


Last 24 Hours: 


My Active Orders





07/26/17 13:48


Ambulate [RC] ASDIRECTED 


Elevate Extremity [RC] BID 


Oxygen Therapy [RC] PRN 


VTE/DVT Education [RC] PER UNIT ROUTINE 


Vital Signs [RC] Q4H 


Wound Care [RC] DAILY 


Acetaminophen [Tylenol]   650 mg PO Q4H PRN 


Ondansetron [Zofran]   4 mg IVPUSH Q4H PRN 


Resuscitation Status Routine 





07/26/17 14:00


Enoxaparin [Lovenox]   40 mg SUBCUT DAILY 


Pregabalin [Lyrica]   200 mg PO TID 


Vancomycin Pharmacy to Dose [Pharmacy to Dose - Vancomycin]   1 dose .XX 

ASDIRECTED 





07/26/17 14:15


Nicotine [Habitrol]   14 mg TRDERM DAILY 





07/26/17 15:00


Docusate Sodium [Colace]   100 mg PO DAILY 


Polyethylene Glycol 3350 [MiraLAX]   17 gm PO DAILY 





07/26/17 15:11


Morphine   3 mg IVPUSH Q4H PRN 





07/26/17 15:12


oxyCODONE   5 - 10 mg PO Q4H PRN 





07/26/17 16:55


Foot w Cont Lt [MR] Routine 





07/26/17 17:00


Pantoprazole [ProTONIX***]   40 mg PO BIDAC 





07/26/17 21:00


Cyclobenzaprine [Flexeril]   20 mg PO BID 





07/26/17 Dinner


Regular Diet [DIET] 





07/27/17 08:12


Foot wo Cont Lt [MR] Routine 





07/27/17 08:16


Communication Order [RC] PRN 





07/27/17 09:00


amLODIPine [Norvasc]   5 mg PO DAILY 





07/28/17 05:11


BASIC METABOLIC PANEL,BMP [CHEM] AM 


CBC WITH AUTO DIFF [HEME] AM 





07/29/17 05:11


BASIC METABOLIC PANEL,BMP [CHEM] AM 


CBC WITH AUTO DIFF [HEME] AM 














- Plan


Plan:: 


This 51 year old female admitted with L foot cellulitis, which failed 

outpatient treatment





1. Cellulitis: Continue Vancomycin. Continue betadine cleanse and gauze wraps 

per Podiatry.  No drainage. BC negative x 1. Oxycodone for pain PRN. MRI 

negative for Osteomyelitis.





2. Open wounds: Continue betadine and gauze wrap to bilateral bunions as per 

Podiatry. Consult PT for wound care





3. HTN: Continue Norvasc





4. Fibromyalgia: Continue Flexeril and Gabapentin





VTE prophylaxis: Lovenox





Dispo: 2-3 days.

## 2017-07-28 LAB
CHLORIDE SERPL-SCNC: 108 MMOL/L (ref 98–110)
SODIUM SERPL-SCNC: 142 MMOL/L (ref 136–146)

## 2017-07-28 NOTE — PCM.PN
- General Info


Date of Service: 07/28/17


Admission Dx/Problem (Free Text): 


L foot cellulitis





Subjective Update: 


Doing better today, pain, swelling and erythema improved today. No chest pain 

or SOB.





Functional Status: Reports: Pain Controlled, Tolerating Diet, Ambulating, 

Urinating





- Review of Systems


Pulmonary: Reports: No Symptoms.  Denies: Shortness of Breath


Cardiovascular: Reports: No Symptoms.  Denies: Chest Pain


Gastrointestinal: Reports: No Symptoms.  Denies: Abdominal Pain


Genitourinary: Reports: No Symptoms.  Denies: Dysuria, Frequency, Burning


Skin: Reports: Dryness, Other (erythema improving ot bilateral feet)





- Patient Data


Vitals - Most Recent: 


 Last Vital Signs











Temp  97.4 F   07/28/17 08:06


 


Pulse  74   07/28/17 08:06


 


Resp  18   07/28/17 08:06


 


BP  119/74   07/28/17 08:28


 


Pulse Ox  91 L  07/28/17 08:06











Weight - Most Recent: 86.3 kg


I&O - Last 24 Hours: 


 Intake & Output











 07/27/17 07/28/17 07/28/17





 22:59 06:59 14:59


 


Intake Total 1100 1350 


 


Output Total 3600 2150 


 


Balance -2500 -800 











Lab Results Last 24 Hours: 


 Laboratory Results - last 24 hr











  07/27/17 07/28/17 07/28/17 Range/Units





  23:30 04:56 04:56 


 


WBC   5.06   (4.0-11.0)  K/uL


 


RBC   3.90 L   (4.30-5.90)  M/uL


 


Hgb   12.5   (12.0-16.0)  g/dL


 


Hct   37.8   (36.0-46.0)  %


 


MCV   96.9   (80.0-98.0)  fL


 


MCH   32.1 H   (27.0-32.0)  pg


 


MCHC   33.1   (31.0-37.0)  g/dL


 


RDW Std Deviation   50.2   (28.0-62.0)  fl


 


RDW Coeff of Inocente   14   (11.0-15.0)  %


 


Plt Count   205   (150-400)  K/uL


 


MPV   10.30   (7.40-12.00)  fL


 


Neut % (Auto)   53.9   (48.0-80.0)  %


 


Lymph % (Auto)   33.0   (16.0-40.0)  %


 


Mono % (Auto)   8.7   (0.0-15.0)  %


 


Eos % (Auto)   3.4   (0.0-7.0)  %


 


Baso % (Auto)   1.0   (0.0-1.5)  %


 


Neut # (Auto)   2.7   (1.4-5.7)  K/uL


 


Lymph # (Auto)   1.7   (0.6-2.4)  K/uL


 


Mono # (Auto)   0.4   (0.0-0.8)  K/uL


 


Eos # (Auto)   0.2   (0.0-0.7)  K/uL


 


Baso # (Auto)   0.1   (0.0-0.1)  K/uL


 


Nucleated RBC %   0.0   /100WBC


 


Nucleated RBCs #   0   K/uL


 


Sodium    142  (136-146)  mmol/L


 


Potassium    4.2  (3.5-5.1)  mmol/L


 


Chloride    108  ()  mmol/L


 


Carbon Dioxide    25  (21-31)  mmol/L


 


BUN    18  (6.0-23.0)  mg/dL


 


Creatinine    0.9  (0.6-1.5)  mg/dL


 


Est Cr Clr Drug Dosing    70.30  mL/min


 


Estimated GFR (MDRD)    > 60.0  ml/min


 


Glucose    118 H  ()  mg/dL


 


Calcium    9.1  (8.8-10.8)  mg/dL


 


Vancomycin Trough  13.3    (5-15)  ug/mL











Med Orders - Current: 


 Current Medications





Acetaminophen (Tylenol)  650 mg PO Q4H PRN


   PRN Reason: Pain


   Last Admin: 07/27/17 16:27 Dose:  650 mg


Amlodipine Besylate (Norvasc)  5 mg PO DAILY [facility]


   Last Admin: 07/28/17 08:28 Dose:  5 mg


Bisacodyl (Dulcolax)  5 mg PO DAILY [facility]


   PRN Reason: Constipation


   Last Admin: 07/27/17 16:28 Dose:  5 mg


Cyclobenzaprine HCl (Flexeril)  20 mg PO BID [facility]


   Last Admin: 07/28/17 08:27 Dose:  20 mg


Docusate Sodium (Colace)  100 mg PO DAILY [facility]


   Last Admin: 07/28/17 08:27 Dose:  100 mg


Enoxaparin Sodium (Lovenox)  40 mg SUBCUT DAILY [facility]


   Last Admin: 07/28/17 08:26 Dose:  40 mg


Vancomycin HCl 1,250 mg/ (Sodium Chloride)  250 mls @ 250 mls/hr IV Q12H [facility]


   Last Infusion: 07/28/17 01:45 Dose:  Infused


Morphine Sulfate (Morphine)  3 mg IVPUSH Q4H PRN


   PRN Reason: Pain


   Last Admin: 07/28/17 04:22 Dose:  3 mg


Nicotine (Habitrol)  14 mg TRDERM DAILY [facility]


   Last Admin: 07/28/17 08:26 Dose:  14 mg


Ondansetron HCl (Zofran)  4 mg IVPUSH Q4H PRN


   PRN Reason: Nausea


Oxycodone HCl (Oxycodone)  5 - 10 mg PO Q4H PRN


   PRN Reason: Pain (moderate 4-6)


   Last Admin: 07/28/17 08:25 Dose:  10 mg


Pantoprazole Sodium (Protonix***)  40 mg PO BIDAC [facility]


   Last Admin: 07/28/17 06:32 Dose:  40 mg


Polyethylene Glycol (Miralax)  17 gm PO DAILY [facility]


   Last Admin: 07/28/17 08:25 Dose:  17 gm


Pregabalin (Lyrica)  200 mg PO TID [facility]


   Last Admin: 07/28/17 06:32 Dose:  200 mg


Vancomycin HCl (Pharmacy To Dose - Vancomycin)  1 dose .XX ASDIRECTED [facility]





Discontinued Medications





Gadobenate Dimeglumine (Multihance)  20 ml IVPUSH ONETIME STA


   Stop: 07/26/17 19:37


   Last Admin: 07/26/17 19:37 Dose:  16 ml


Sodium Chloride (Normal Saline)  1,000 mls @ 125 mls/hr IV STAT [facility]


   Last Admin: 07/26/17 12:38 Dose:  125 mls/hr


Vancomycin HCl 1 gm/ Sodium (Chloride)  250 mls @ 250 mls/hr IV ONETIME ONE


   Stop: 07/26/17 13:10


   Last Admin: 07/26/17 12:39 Dose:  250 mls/hr


Vancomycin HCl 1,500 mg/ (Sodium Chloride)  500 mls @ 333.333 mls/hr IV Q12H [facility]


Vancomycin HCl 1,250 mg/ (Sodium Chloride)  250 mls @ 250 mls/hr IV Q12H [facility]


Morphine Sulfate (Morphine)  3 mg IVPUSH ONETIME ONE


   Stop: 07/27/17 12:12


   Last Admin: 07/27/17 12:56 Dose:  3 mg


Omeprazole (Omeprazole)  20 mg PO TID [facility]


   Last Admin: 07/26/17 14:24 Dose:  20 mg


Oxycodone HCl (Oxycodone)  5 mg PO Q4H PRN


   PRN Reason: Pain (moderate 4-6)


   Last Admin: 07/26/17 14:23 Dose:  5 mg











- Exam


General: Alert, Oriented, Cooperative, No Acute Distress


Lungs: Clear to Auscultation, Normal Respiratory Effort


Cardiovascular: Regular Rate, Regular Rhythm


Extremities: Normal Inspection, Normal Range of Motion, Non-Tender, No Pedal 

Edema, Normal Capillary Refill


Wound/Incisions: Erythema Improving (To L dorsum of foot and medial ankle much 

improved, warm still present, no fluctuance noted pain decreased. Crevices to 

bilateral callouses improving with new dressings.)


Psy/Mental Status: Alert, Normal Affect, Normal Mood





- Problem List & Annotations


(1) Cellulitis


SNOMED Code(s): 655583162


   Code(s): L03.90 - CELLULITIS, UNSPECIFIED   Status: Acute   Current Visit: 

Yes   


Qualifiers: 


   Site of cellulitis: extremity   Site of cellulitis of extremity: lower 

extremity   Laterality: left   Qualified Code(s): L03.116 - Cellulitis of left 

lower limb   





(2) RA (rheumatoid arthritis)


SNOMED Code(s): 16207293


   Code(s): M06.9 - RHEUMATOID ARTHRITIS, UNSPECIFIED   Status: Chronic   

Current Visit: Yes   


Qualifiers: 


   Rheumatoid arthritis location: wrist   Laterality: bilateral 





(3) Fibromyalgia


SNOMED Code(s): 809733083


   Code(s): M79.7 - FIBROMYALGIA   Status: Chronic   Current Visit: Yes   





(4) Hallux valgus (acquired), left foot


SNOMED Code(s): 18323580


   Code(s): M20.12 - HALLUX VALGUS (ACQUIRED), LEFT FOOT   Status: Chronic   

Current Visit: Yes   





(5) Hallux valgus (acquired), right foot


SNOMED Code(s): 09485203


   Code(s): M20.11 - HALLUX VALGUS (ACQUIRED), RIGHT FOOT   Status: Chronic   

Current Visit: Yes   





(6) HTN (hypertension)


SNOMED Code(s): 70898344


   Code(s): I10 - ESSENTIAL (PRIMARY) HYPERTENSION   Status: Chronic   Current 

Visit: Yes   


Qualifiers: 


   Hypertension type: essential hypertension   Qualified Code(s): I10 - 

Essential (primary) hypertension   





(7) Nicotine abuse


SNOMED Code(s): 33186767


   Code(s): Z72.0 - TOBACCO USE   Status: Chronic   Current Visit: Yes   





- Problem List Review


Problem List Initiated/Reviewed/Updated: Yes





- My Orders


Last 24 Hours: 


My Active Orders





07/27/17 11:24


Consult to Physical Therapy [PT Evaluation and Treatment] [CONS] Routine 





07/29/17 05:11


BASIC METABOLIC PANEL,BMP [CHEM] AM 


CBC WITH AUTO DIFF [HEME] AM 














- Plan


Plan:: 


This 51 year old female admitted with L foot cellulitis, which failed 

outpatient treatment





1. Cellulitis: Continue Vancomycin. Wound culture from clinic returned with 

MRSA. SARAH pending. Continue betadine cleanse and gauze/Xeroform wraps per wound 

care.  No drainage. BC negative x 1. Oxycodone for pain PRN. MRI negative for 

Osteomyelitis.





2. Open wounds: Continue betadine and gauze wrap to bilateral bunions. Continue 

to improve, no drainage pain improving. wound beds clean.





3. HTN: Continue Norvasc





4. Fibromyalgia: Continue Flexeril and Gabapentin





VTE prophylaxis: Lovenox





Dispo: 2-3 days.

## 2017-07-29 VITALS — SYSTOLIC BLOOD PRESSURE: 113 MMHG | DIASTOLIC BLOOD PRESSURE: 74 MMHG

## 2017-07-29 LAB
CHLORIDE SERPL-SCNC: 106 MMOL/L (ref 98–110)
SODIUM SERPL-SCNC: 139 MMOL/L (ref 136–146)

## 2017-07-29 NOTE — [id number]
**Discharge Summary





- Discharge Data


Discharge Date: 07/29/17


Discharge Disposition: Home, Self-Care 01


Condition: Good





- Patient Summary/Data


Consults: 


 Consultations





07/27/17 11:24


Consult to Physical Therapy [PT Evaluation and Treatment] [CONS] Routine 











Hospital Course: 


Admission diagnosis


left foot cellulitis.





This 51 year old female with pmh of RA, HTN, and severe bunions to bilateral 

feet presented to the ED who was admitted for left foot cellulitis that had 

failed outpatient management.  As outpatient she had been on Keflex and Bactrim 

without improvement.  She was admitted and treated with IV vancomycin.  Wound 

cultures from [facility] grew out MRSA and group B strep.  She had 

improvement in her erythema and edema of her left foot.  She was discharged 

home on Clindamycin 300mg q6hrs for seven days.





- Discharge Plan


Prescriptions/Med Rec: 


Clindamycin HCl [Cleocin HCl] 300 mg PO Q6H #30 capsule


oxyCODONE 5 mg PO Q6H PRN #10 tablet


 PRN Reason: Pain


Home Medications: 


 Home Meds





Celecoxib [CeleBREX] 200 mg PO DAILY 01/14/17 [History]


Cyclobenzaprine [Flexeril] 20 mg PO BID 01/14/17 [History]


Omeprazole 20 mg PO TID 01/14/17 [History]


Pregabalin [Lyrica] 200 mg PO TID 01/14/17 [History]


amLODIPine [Norvasc] 5 mg PO DAILY 01/14/17 [History]


Clindamycin HCl [Cleocin HCl] 300 mg PO Q6H #30 capsule 07/29/17 [Rx]


oxyCODONE 5 mg PO Q6H PRN #10 tablet 07/29/17 [Rx]








Patient Handouts:  Oxycodone tablets or capsules, Clindamycin capsules, 

Cellulitis, Adult, Easy-to-Read


Referrals: 


Betsy Beyer NP [Primary Care Provider] - 08/04/17 1:00 pm





- Patient Data


Vitals - Most Recent: 


 Last Vital Signs











Temp  36.3 C   07/29/17 08:00


 


Pulse  77   07/29/17 08:00


 


Resp  12   07/29/17 08:00


 


BP  113/74   07/29/17 08:52


 


Pulse Ox  93 L  07/29/17 08:00











Weight - Most Recent: 86.3 kg


I&O - Last 24 hours: 


 Intake & Output











 07/28/17 07/29/17 07/29/17





 22:59 06:59 14:59


 


Intake Total 1340 1655 


 


Output Total 0374 1900 


 


Balance 40 -245 











Lab Results - Last 24 hrs: 


 Laboratory Results - last 24 hr











  07/29/17 07/29/17 Range/Units





  05:08 05:08 


 


WBC  5.08   (4.0-11.0)  K/uL


 


RBC  3.99 L   (4.30-5.90)  M/uL


 


Hgb  12.5   (12.0-16.0)  g/dL


 


Hct  38.4   (36.0-46.0)  %


 


MCV  96.2   (80.0-98.0)  fL


 


MCH  31.3   (27.0-32.0)  pg


 


MCHC  32.6   (31.0-37.0)  g/dL


 


RDW Std Deviation  49.2   (28.0-62.0)  fl


 


RDW Coeff of Inocente  14   (11.0-15.0)  %


 


Plt Count  209   (150-400)  K/uL


 


MPV  10.00   (7.40-12.00)  fL


 


Neut % (Auto)  47.0 L   (48.0-80.0)  %


 


Lymph % (Auto)  39.8   (16.0-40.0)  %


 


Mono % (Auto)  9.3   (0.0-15.0)  %


 


Eos % (Auto)  3.1   (0.0-7.0)  %


 


Baso % (Auto)  0.8   (0.0-1.5)  %


 


Neut # (Auto)  2.4   (1.4-5.7)  K/uL


 


Lymph # (Auto)  2.0   (0.6-2.4)  K/uL


 


Mono # (Auto)  0.5   (0.0-0.8)  K/uL


 


Eos # (Auto)  0.2   (0.0-0.7)  K/uL


 


Baso # (Auto)  0.0   (0.0-0.1)  K/uL


 


Nucleated RBC %  0.0   /100WBC


 


Nucleated RBCs #  0   K/uL


 


Sodium   139  (136-146)  mmol/L


 


Potassium   3.9  (3.5-5.1)  mmol/L


 


Chloride   106  ()  mmol/L


 


Carbon Dioxide   24  (21-31)  mmol/L


 


BUN   16  (6.0-23.0)  mg/dL


 


Creatinine   0.8  (0.6-1.5)  mg/dL


 


Est Cr Clr Drug Dosing   79.08  mL/min


 


Estimated GFR (MDRD)   > 60.0  ml/min


 


Glucose   102  ()  mg/dL


 


Calcium   9.3  (8.8-10.8)  mg/dL











Med Orders - Current: 


 Current Medications





Acetaminophen (Tylenol)  650 mg PO Q4H PRN


   PRN Reason: Pain


   Last Admin: 07/27/17 16:27 Dose:  650 mg


Amlodipine Besylate (Norvasc)  5 mg PO DAILY [facility]


   Last Admin: 07/29/17 08:52 Dose:  5 mg


Bisacodyl (Dulcolax)  5 mg PO DAILY PRN


   PRN Reason: Constipation


   Last Admin: 07/27/17 16:28 Dose:  5 mg


Cyclobenzaprine HCl (Flexeril)  20 mg PO BID [facility]


   Last Admin: 07/29/17 08:51 Dose:  20 mg


Docusate Sodium (Colace)  100 mg PO DAILY [facility]


   Last Admin: 07/29/17 08:52 Dose:  100 mg


Enoxaparin Sodium (Lovenox)  40 mg SUBCUT DAILY [facility]


   Last Admin: 07/29/17 08:52 Dose:  40 mg


Vancomycin HCl 1,250 mg/ (Sodium Chloride)  250 mls @ 250 mls/hr IV Q12H [facility]


   Last Admin: 07/29/17 00:16 Dose:  250 mls/hr


Morphine Sulfate (Morphine)  3 mg IVPUSH Q4H PRN


   PRN Reason: Pain


   Last Admin: 07/29/17 05:09 Dose:  3 mg


Nicotine (Habitrol)  14 mg TRDERM DAILY [facility]


   Last Admin: 07/29/17 08:53 Dose:  14 mg


Ondansetron HCl (Zofran)  4 mg IVPUSH Q4H PRN


   PRN Reason: Nausea


Oxycodone HCl (Oxycodone)  5 - 10 mg PO Q4H PRN


   PRN Reason: Pain (moderate 4-6)


   Last Admin: 07/29/17 06:36 Dose:  10 mg


Pantoprazole Sodium (Protonix***)  40 mg PO BIDAC [facility]


   Last Admin: 07/29/17 06:36 Dose:  40 mg


Polyethylene Glycol (Miralax)  17 gm PO DAILY [facility]


   Last Admin: 07/29/17 08:52 Dose:  17 gm


Pregabalin (Lyrica)  200 mg PO TID [facility]


   Last Admin: 07/29/17 06:36 Dose:  200 mg


Vancomycin HCl (Pharmacy To Dose - Vancomycin)  1 dose .XX ASDIRECTED [facility]





Discontinued Medications





Gadobenate Dimeglumine (Multihance)  20 ml IVPUSH ONETIME STA


   Stop: 07/26/17 19:37


   Last Admin: 07/26/17 19:37 Dose:  16 ml


Sodium Chloride (Normal Saline)  1,000 mls @ 125 mls/hr IV STAT [facility]


   Last Admin: 07/26/17 12:38 Dose:  125 mls/hr


Vancomycin HCl 1 gm/ Sodium (Chloride)  250 mls @ 250 mls/hr IV ONETIME ONE


   Stop: 07/26/17 13:10


   Last Admin: 07/26/17 12:39 Dose:  250 mls/hr


Vancomycin HCl 1,500 mg/ (Sodium Chloride)  500 mls @ 333.333 mls/hr IV Q12H [facility]


Vancomycin HCl 1,250 mg/ (Sodium Chloride)  250 mls @ 250 mls/hr IV Q12H [facility]


Morphine Sulfate (Morphine)  3 mg IVPUSH ONETIME ONE


   Stop: 07/27/17 12:12


   Last Admin: 07/27/17 12:56 Dose:  3 mg


Omeprazole (Omeprazole)  20 mg PO TID [facility]


   Last Admin: 07/26/17 14:24 Dose:  20 mg


Oxycodone HCl (Oxycodone)  5 mg PO Q4H PRN


   PRN Reason: Pain (moderate 4-6)


   Last Admin: 07/26/17 14:23 Dose:  5 mg











*Q Meaningful Use (DIS)





- VTE *Q


VTE Criteria *Q: 








- Stroke *Q


Stroke Criteria *Q: 








- AMI *Q


AMI Criteria *Q:

## 2019-07-17 ENCOUNTER — HOSPITAL ENCOUNTER (OUTPATIENT)
Dept: HOSPITAL 56 - MW.ED | Age: 54
Setting detail: OBSERVATION
LOS: 1 days | Discharge: HOME | End: 2019-07-18
Attending: INTERNAL MEDICINE | Admitting: INTERNAL MEDICINE
Payer: COMMERCIAL

## 2019-07-17 DIAGNOSIS — Z91.048: ICD-10-CM

## 2019-07-17 DIAGNOSIS — Z79.1: ICD-10-CM

## 2019-07-17 DIAGNOSIS — Z79.899: ICD-10-CM

## 2019-07-17 DIAGNOSIS — I10: ICD-10-CM

## 2019-07-17 DIAGNOSIS — M06.9: ICD-10-CM

## 2019-07-17 DIAGNOSIS — R07.2: Primary | ICD-10-CM

## 2019-07-17 DIAGNOSIS — Z91.040: ICD-10-CM

## 2019-07-17 DIAGNOSIS — F17.200: ICD-10-CM

## 2019-07-17 DIAGNOSIS — Z88.1: ICD-10-CM

## 2019-07-17 LAB
CHLORIDE SERPL-SCNC: 103 MMOL/L (ref 98–107)
SODIUM SERPL-SCNC: 138 MMOL/L (ref 136–145)

## 2019-07-17 PROCEDURE — 93005 ELECTROCARDIOGRAM TRACING: CPT

## 2019-07-17 PROCEDURE — G0378 HOSPITAL OBSERVATION PER HR: HCPCS

## 2019-07-17 PROCEDURE — 85025 COMPLETE CBC W/AUTO DIFF WBC: CPT

## 2019-07-17 PROCEDURE — 71045 X-RAY EXAM CHEST 1 VIEW: CPT

## 2019-07-17 PROCEDURE — 80053 COMPREHEN METABOLIC PANEL: CPT

## 2019-07-17 PROCEDURE — 81001 URINALYSIS AUTO W/SCOPE: CPT

## 2019-07-17 PROCEDURE — 87086 URINE CULTURE/COLONY COUNT: CPT

## 2019-07-17 PROCEDURE — 99285 EMERGENCY DEPT VISIT HI MDM: CPT

## 2019-07-17 PROCEDURE — 96374 THER/PROPH/DIAG INJ IV PUSH: CPT

## 2019-07-17 PROCEDURE — 84484 ASSAY OF TROPONIN QUANT: CPT

## 2019-07-17 RX ADMIN — OMEPRAZOLE SCH MG: 20 CAPSULE, DELAYED RELEASE ORAL at 17:16

## 2019-07-17 NOTE — PCM.HP
H&P History of Present Illness





- General


Date of Service: 07/17/19


Admit Problem/Dx: 


 Admission Diagnosis/Problem





Admission Diagnosis/Problem      Chest pain, rule out acute myocardial 

infarction











- History of Present Illness


Initial Comments - Free Text/Narative: 





54 yo female with pmh of RA, and hypertension who presents with a one day 

history of chest pain.  The pain is on the chest wall and started when she woke 

up this morning.  Moving her arm makes the pain worse.  She had similar pain a 

week ago that occured when she had cold and was coughing.  She denies any 

current cough, fevers, shortness of breath or lightheadedness.  


  ** Chest


Pain Score (Numeric/FACES): 8





- Related Data


Allergies/Adverse Reactions: 


 Allergies











Allergy/AdvReac Type Severity Reaction Status Date / Time


 


erythromycin base Allergy  Hives Verified 07/17/19 16:26


 


latex Allergy  Rash Verified 07/17/19 16:26


 


Clear Medical Tape Allergy  Rash Uncoded 07/17/19 16:26











Home Medications: 


 Home Meds





Celecoxib [CeleBREX] 200 mg PO DAILY 01/14/17 [History]


Cyclobenzaprine [Flexeril] 2 tab PO BEDTIME 01/14/17 [History]


Omeprazole 20 mg PO TID 01/14/17 [History]


Pregabalin [Lyrica] 200 mg PO TID 01/14/17 [History]


amLODIPine [Norvasc] 5 mg PO BEDTIME 01/14/17 [History]











Past Medical History


HEENT History: Reports: None, Other (See Below)


Other HEENT History: wears contacts


Cardiovascular History: Reports: Hypertension, Other (See Below)


Other Cardiovascular History: pericarditis 2009


Respiratory History: Reports: None


Gastrointestinal History: Reports: GERD


Genitourinary History: Reports: None


OB/GYN History: Reports: None


Musculoskeletal History: Reports: Fibromyalgia, Osteoarthritis, RA


Neurological History: Reports: None


Psychiatric History: Reports: None


Endocrine/Metabolic History: Reports: None


Hematologic History: Reports: None


Immunologic History: Reports: None


Oncologic (Cancer) History: Reports: None


Dermatologic History: Reports: None


Other Dermatologic History: Raynaud's Syndrome





- Infectious Disease History


Infectious Disease History: Reports: Chicken Pox





- Past Surgical History


Head Surgeries/Procedures: Reports: None


Cardiovascular Surgical History: Reports: None


Female  Surgical History: Reports: Hysterectomy, Other (See Below)


Other Female  Surgeries/Procedures: has left ovary


Neurological Surgical History: Reports: Other (See Below)


Other Neurological Surgeries/Procedures: Raynaud's Syndrome


Musculoskeletal Surgical History: Reports: Other (See Below)


Dermatological Surgical History: Reports: None





Social & Family History





- Family History


Family Medical History: Noncontributory





- Tobacco Use


Smoking Status *Q: Current Every Day Smoker


Years of Tobacco use: 38


Packs/Tins Daily: 1


Used Tobacco, but Quit: No


Second Hand Smoke Exposure: Yes





- Caffeine Use


Caffeine Use: Reports: Coffee





- Alcohol Use


Days Per Week of Alcohol Use: 2


Number of Drinks Per Day: 5


Total Drinks Per Week: 10


Date of Last Drink: 07/15/19


Time of Last Drink: 20:00





- Recreational Drug Use


Recreational Drug Use: No





- Living Situation & Occupation


Living situation: Reports: 


Occupation: Employed





H&P Review of Systems





- Review of Systems:


Review Of Systems: ROS reveals no pertinent complaints other than HPI.





Exam





- Exam


Exam: See Below





- Vital Signs


Vital Signs: 


 Last Vital Signs











Temp  36.4 C   07/17/19 15:46


 


Pulse  75   07/17/19 15:46


 


Resp  18   07/17/19 15:46


 


BP  119/86   07/17/19 15:46


 


Pulse Ox  96   07/17/19 15:46











Weight: 87.498 kg





- Exam


General: Alert, Oriented


HEENT: Mucosa Moist & Gazelle


Neck: Supple, Trachea Midline


Lungs: Clear to Auscultation, Normal Respiratory Effort


Cardiovascular: Regular Rate, Regular Rhythm


GI/Abdominal Exam: Soft, Non-Tender


Extremities: Non-Tender, No Pedal Edema


Skin: Warm, Dry, Intact


Neurological: Cranial Nerves Intact





- Patient Data


Lab Results Last 24 hrs: 


 Laboratory Results - last 24 hr











  07/17/19 07/17/19 07/17/19 Range/Units





  10:55 10:55 12:10 


 


WBC  8.87    (4.0-11.0)  K/uL


 


RBC  4.46    (4.30-5.90)  M/uL


 


Hgb  14.4    (12.0-16.0)  g/dL


 


Hct  41.7    (36.0-46.0)  %


 


MCV  93.5    (80.0-98.0)  fL


 


MCH  32.3 H    (27.0-32.0)  pg


 


MCHC  34.5    (31.0-37.0)  g/dL


 


RDW Std Deviation  47.5    (28.0-62.0)  fl


 


RDW Coeff of Inocente  14    (11.0-15.0)  %


 


Plt Count  214    (150-400)  K/uL


 


MPV  10.00    (7.40-12.00)  fL


 


Neut % (Auto)  57.6    (48.0-80.0)  %


 


Lymph % (Auto)  31.9    (16.0-40.0)  %


 


Mono % (Auto)  8.3    (0.0-15.0)  %


 


Eos % (Auto)  1.7    (0.0-7.0)  %


 


Baso % (Auto)  0.5    (0.0-1.5)  %


 


Neut # (Auto)  5.1    (1.4-5.7)  K/uL


 


Lymph # (Auto)  2.8 H    (0.6-2.4)  K/uL


 


Mono # (Auto)  0.7    (0.0-0.8)  K/uL


 


Eos # (Auto)  0.2    (0.0-0.7)  K/uL


 


Baso # (Auto)  0.0    (0.0-0.1)  K/uL


 


Nucleated RBC %  0.0    /100WBC


 


Nucleated RBCs #  0    K/uL


 


Sodium   138   (136-145)  mmol/L


 


Potassium   4.1   (3.5-5.1)  mmol/L


 


Chloride   103   ()  mmol/L


 


Carbon Dioxide   23.5   (21.0-32.0)  mmol/L


 


BUN   13   (7.0-18.0)  mg/dL


 


Creatinine   0.8   (0.6-1.0)  mg/dL


 


Est Cr Clr Drug Dosing   77.27   mL/min


 


Estimated GFR (MDRD)   > 60.0   ml/min


 


Glucose   123 H   ()  mg/dL


 


Calcium   9.0   (8.5-10.1)  mg/dL


 


Total Bilirubin   0.4   (0.2-1.0)  mg/dL


 


AST   37   (15-37)  IU/L


 


ALT   61   (14-63)  IU/L


 


Alkaline Phosphatase   90   ()  U/L


 


Troponin I   < 0.050   (0.000-0.056)  ng/mL


 


Total Protein   7.8   (6.4-8.2)  g/dL


 


Albumin   3.9   (3.4-5.0)  g/dL


 


Globulin   3.9   (2.6-4.0)  g/dL


 


Albumin/Globulin Ratio   1.0   (0.9-1.6)  


 


Urine Color    YELLOW  


 


Urine Appearance    SLT CLOUDY  


 


Urine pH    5.5  (5.0-8.0)  


 


Ur Specific Gravity    <= 1.005  (1.001-1.035)  


 


Urine Protein    NEGATIVE  (NEGATIVE)  mg/dL


 


Urine Glucose (UA)    NEGATIVE  (NEGATIVE)  mg/dL


 


Urine Ketones    NEGATIVE  (NEGATIVE)  mg/dL


 


Urine Occult Blood    TRACE-INTACT H  (NEGATIVE)  


 


Urine Nitrite    NEGATIVE  (NEGATIVE)  


 


Urine Bilirubin    NEGATIVE  (NEGATIVE)  


 


Urine Urobilinogen    0.2  (<2.0)  EU/dL


 


Ur Leukocyte Esterase    SMALL H  (NEGATIVE)  


 


Urine RBC    0-1  (0-2/HPF)  


 


Urine WBC    1-3  (0-5/HPF)  


 


Ur Epithelial Cells    FEW  (NONE-FEW)  


 


Urine Bacteria    RARE  (NEGATIVE)  


 


Urine Mucus    LIGHT  (NONE-MOD)  











Result Diagrams: 


 07/17/19 10:55





 07/17/19 10:55


Problem List Initiated/Reviewed/Updated: Yes


Orders Last 24hrs: 


 Active Orders 24 hr











 Category Date Time Status


 


 Admission Status [Patient Status] [ADT] Stat ADT  07/17/19 12:00 Active


 


 Regular Diet [DIET] Diet  07/17/19 Breakfast Ordered


 


 CULTURE URINE [RM] Stat Lab  07/17/19 12:10 Received


 


 Cyclobenzaprine [Flexeril] Med  07/17/19 21:00 Ordered





 20 mg PO BID   


 


 Morphine Med  07/17/19 14:47 Ordered





 2 mg IVPUSH Q3H PRN   


 


 Omeprazole [Omeprazole] Med  07/17/19 22:00 Ordered





 20 mg PO TID   


 


 Pregabalin [Lyrica] Med  07/17/19 22:00 Ordered





 200 mg PO TID   


 


 Sodium Chloride 0.9% [Saline Flush] Med  07/17/19 10:53 Active





 10 ml FLUSH ASDIRECTED PRN   


 


 Sodium Chloride 0.9% [Saline Flush] Med  07/17/19 10:53 Active





 2.5 ml FLUSH ASDIRECTED PRN   


 


 amLODIPine [Norvasc] Med  07/18/19 09:00 Ordered





 5 mg PO DAILY   


 


 Saline Lock Insert [OM.PC] Stat Oth  07/17/19 10:53 Ordered








 Medication Orders





Amlodipine Besylate (Norvasc)  5 mg PO DAILY [facility]


Cyclobenzaprine HCl (Flexeril)  10 mg PO BID [facility]


Morphine Sulfate (Morphine)  2 mg IVPUSH Q3H PRN


   PRN Reason: Pain


   Last Admin: 07/17/19 15:20  Dose: 2 mg


Non-Formulary Medication (Omeprazole [Omeprazole])  20 mg PO BIDAC [facility]


Pregabalin (Lyrica)  200 mg PO TID [facility]


Sodium Chloride (Saline Flush)  10 ml FLUSH ASDIRECTED PRN


   PRN Reason: Keep Vein Open


   Last Admin: 07/17/19 11:28  Dose: 10 ml


Sodium Chloride (Saline Flush)  2.5 ml FLUSH ASDIRECTED PRN


   PRN Reason: Keep Vein Open


   Last Admin: 07/17/19 11:28  Dose: 2.5 ml








Assessment/Plan Comment:: 





54 yo female admitted for chest pain. She ruled out for acute coronary syndrome 

with serial negative cardiac enzymes.  I suspect pain is musculoskeletal in 

nature.  She was discharged home.  She has follow up with her rheumatologist 

next week.

## 2019-07-17 NOTE — CR
EXAMINATION: Portable chest radiograph.

 

HISTORY: Chest pain.

 

FINDINGS: 

The trachea is midline. The cardiomediastinal silhouette is within normal

limits. No pulmonary infiltrates, effusions or pneumothorax.

 

Osseous structures appear unremarkable.

 

IMPRESSION: 

No acute cardiopulmonary process.

## 2019-07-17 NOTE — EDM.PDOC
ED HPI GENERAL MEDICAL PROBLEM





- General


Chief Complaint: Chest Pain


Stated Complaint: chest pain


Time Seen by Provider: 07/17/19 10:55


Source of Information: Reports: Patient


History Limitations: Reports: No Limitations





- History of Present Illness


INITIAL COMMENTS - FREE TEXT/NARRATIVE: 


HISTORY AND PHYSICAL:





History of present illness:


Patient is a 53-year-old female who presents to the emergency room with 

complaints of midsternal chest pain that started last evening. She states that 

she was able to get comfortable enough to fall asleep. When she woke up to get 

ready for work she noticed that the pain was still there. Did notice that the 

pain was aggravated while having to turn her steering well. Describes it as a 

"constant pressure" to her mid sternum and across her chest. This pain does not 

radiate into her neck, jaw or shoulders. Denies any other associated symptoms. 

Does have a past medical history of hypertension and endocarditis in 2009. No 

significant family history of heart disease. He is a pack per day smoker over 

the past 30+ years.





Patient denies any fever, chills, headache, change in vision, syncope or near 

syncope. Denies any back pain, shortness of breath or cough. Denies any 

abdominal pain, nausea, vomiting, diarrhea, constipation or dysuria. Has not 

noted any blood in urine or stool. Patient has been eating and drinking 

appropriately. Patient does have a past medical history fibromyalgia and 

hypertension.





Review of systems: 


As per history of present illness and below otherwise all systems reviewed and 

negative.





Past medical history: 


As per history of present illness and as reviewed below otherwise 

noncontributory.





Surgical history: 


As per history of present illness and as reviewed below otherwise 

noncontributory.





Social history: 


See social history for further information





Family history: 


As per history of present illness and as reviewed below otherwise 

noncontributory.





Physical exam:


General: Well-developed and well-nourished 53-year-old female. Alert and 

oriented. Nontoxic appearing and in no acute distress. Vital signs are stable 

and have been reviewed by me.


HEENT: Atraumatic, normocephalic, pupils equal and reactive bilaterally, 

negative for conjunctival pallor or scleral icterus, mucous membranes moist, 

TMs normal bilaterally, throat clear, neck supple, nontender, trachea midline. 

No drooling or trismus noted. No meningeal signs. No hot potato voice noted. 


Lungs: Clear to auscultation, breath sounds equal bilaterally, chest nontender. 

Not reproducible.


Heart: S1S2, regular rate and rhythm without overt murmur


Abdomen: Soft, nondistended, nontender. Negative for masses or 

hepatosplenomegaly. Negative for costovertebral tenderness.


Pelvis: Stable nontender.


Skin: Intact, warm, dry. No lesions or rashes noted.


Extremities: Atraumatic, moves all extremities per self without difficulty or 

deficits. Neurovascular unremarkable.


Neuro: Awake, alert, oriented. Cranial nerves II through XII unremarkable. 

Cerebellum unremarkable. Motor and sensory unremarkable throughout. Exam 

nonfocal.





Notes:


Lab work is unremarkable. No significant findings on EKG. patient states her 

pain has resolved. States occasionally she can feel pressure does not describe 

this as painful. Her vital signs remain stable. We discussed admission, she 

would like that as she states she does not feel comfortable eating home by 

herself. Dr. Maddox was consulted on this case and is agreeable for further 

evaluation and management. Patient will be placed on telemetry.





Diagnostics:


CBC, CMP, Troponin, EKG, CXR, UA





Therapeutics:


Saline Lock, ASA, morphine





Impression: 


Chest pain rule out MI





Plan:


Observation admission with telemetry.





Definitive disposition and diagnosis as appropriate pending reevaluation and 

review of above.





  ** Chest


Pain Score (Numeric/FACES): 10





- Related Data


 Allergies











Allergy/AdvReac Type Severity Reaction Status Date / Time


 


erythromycin base Allergy  Hives Verified 07/17/19 10:54


 


latex Allergy  Rash Verified 07/17/19 10:54


 


Clear Medical Tape Allergy  Rash Uncoded 07/17/19 10:54











Home Meds: 


 Home Meds





Celecoxib [CeleBREX] 200 mg PO DAILY 01/14/17 [History]


Cyclobenzaprine [Flexeril] 20 mg PO BID 01/14/17 [History]


Omeprazole 20 mg PO TID 01/14/17 [History]


Pregabalin [Lyrica] 200 mg PO TID 01/14/17 [History]


amLODIPine [Norvasc] 5 mg PO DAILY 01/14/17 [History]











Past Medical History


HEENT History: Reports: None


Cardiovascular History: Reports: Hypertension, Other (See Below) (pericardial 

effusion, which was drained approx 2009.).  Denies: Afib, Blood Clots/VTE/DVT, 

MI


Respiratory History: Reports: None.  Denies: COPD, SOB


Gastrointestinal History: Reports: GERD.  Denies: GI Bleed


Genitourinary History: Reports: None.  Denies: Acute Renal Failure, Chronic 

Renal Insuffiency


OB/GYN History: Reports: None


Musculoskeletal History: Reports: Fibromyalgia, RA


Neurological History: Reports: None


Psychiatric History: Reports: None


Endocrine/Metabolic History: Reports: None


Hematologic History: Reports: None


Immunologic History: Reports: None


Oncologic (Cancer) History: Reports: None


Dermatologic History: Reports: None





- Infectious Disease History


Infectious Disease History: Reports: Chicken Pox





- Past Surgical History


Neurological Surgical History: Reports: Other (See Below)


Musculoskeletal Surgical History: Reports: Other (See Below)





Social & Family History





- Family History


Family Medical History: Noncontributory





- Caffeine Use


Caffeine Use: Reports: Coffee, Soda





- Living Situation & Occupation


Living situation: Reports: 


Occupation: Employed





ED ROS GENERAL





- Review of Systems


Review Of Systems: ROS reveals no pertinent complaints other than HPI.





ED EXAM, GENERAL





- Physical Exam


Exam: See Below (See dictation)





Course





- Vital Signs


Last Recorded V/S: 


 Last Vital Signs











Temp  98.0 F   07/17/19 10:51


 


Pulse  87   07/17/19 11:28


 


Resp  13   07/17/19 11:28


 


BP  147/84 H  07/17/19 11:28


 


Pulse Ox  95   07/17/19 11:28














- Orders/Labs/Meds


Orders: 


 Active Orders 24 hr











 Category Date Time Status


 


 Admission Status [Patient Status] [ADT] Stat ADT  07/17/19 12:00 Ordered


 


 EKG Documentation Completion [RC] STAT Care  07/17/19 10:53 Active


 


 UA RFX SARAH AND CULT IF INDIC [URIN] Stat Lab  07/17/19 10:53 Ordered


 


 Sodium Chloride 0.9% [Saline Flush] Med  07/17/19 10:53 Active





 10 ml FLUSH ASDIRECTED PRN   


 


 Sodium Chloride 0.9% [Saline Flush] Med  07/17/19 10:53 Active





 2.5 ml FLUSH ASDIRECTED PRN   


 


 Saline Lock Insert [OM.PC] Stat Oth  07/17/19 10:53 Ordered








 Medication Orders





Sodium Chloride (Saline Flush)  10 ml FLUSH ASDIRECTED PRN


   PRN Reason: Keep Vein Open


   Last Admin: 07/17/19 11:28  Dose: 10 ml


Sodium Chloride (Saline Flush)  2.5 ml FLUSH ASDIRECTED PRN


   PRN Reason: Keep Vein Open


   Last Admin: 07/17/19 11:28  Dose: 2.5 ml








Labs: 


 Laboratory Tests











  07/17/19 07/17/19 Range/Units





  10:55 10:55 


 


WBC  8.87   (4.0-11.0)  K/uL


 


RBC  4.46   (4.30-5.90)  M/uL


 


Hgb  14.4   (12.0-16.0)  g/dL


 


Hct  41.7   (36.0-46.0)  %


 


MCV  93.5   (80.0-98.0)  fL


 


MCH  32.3 H   (27.0-32.0)  pg


 


MCHC  34.5   (31.0-37.0)  g/dL


 


RDW Std Deviation  47.5   (28.0-62.0)  fl


 


RDW Coeff of Inocente  14   (11.0-15.0)  %


 


Plt Count  214   (150-400)  K/uL


 


MPV  10.00   (7.40-12.00)  fL


 


Neut % (Auto)  57.6   (48.0-80.0)  %


 


Lymph % (Auto)  31.9   (16.0-40.0)  %


 


Mono % (Auto)  8.3   (0.0-15.0)  %


 


Eos % (Auto)  1.7   (0.0-7.0)  %


 


Baso % (Auto)  0.5   (0.0-1.5)  %


 


Neut # (Auto)  5.1   (1.4-5.7)  K/uL


 


Lymph # (Auto)  2.8 H   (0.6-2.4)  K/uL


 


Mono # (Auto)  0.7   (0.0-0.8)  K/uL


 


Eos # (Auto)  0.2   (0.0-0.7)  K/uL


 


Baso # (Auto)  0.0   (0.0-0.1)  K/uL


 


Nucleated RBC %  0.0   /100WBC


 


Nucleated RBCs #  0   K/uL


 


Sodium   138  (136-145)  mmol/L


 


Potassium   4.1  (3.5-5.1)  mmol/L


 


Chloride   103  ()  mmol/L


 


Carbon Dioxide   23.5  (21.0-32.0)  mmol/L


 


BUN   13  (7.0-18.0)  mg/dL


 


Creatinine   0.8  (0.6-1.0)  mg/dL


 


Est Cr Clr Drug Dosing   77.27  mL/min


 


Estimated GFR (MDRD)   > 60.0  ml/min


 


Glucose   123 H  ()  mg/dL


 


Calcium   9.0  (8.5-10.1)  mg/dL


 


Total Bilirubin   0.4  (0.2-1.0)  mg/dL


 


AST   37  (15-37)  IU/L


 


ALT   61  (14-63)  IU/L


 


Alkaline Phosphatase   90  ()  U/L


 


Troponin I   < 0.050  (0.000-0.056)  ng/mL


 


Total Protein   7.8  (6.4-8.2)  g/dL


 


Albumin   3.9  (3.4-5.0)  g/dL


 


Globulin   3.9  (2.6-4.0)  g/dL


 


Albumin/Globulin Ratio   1.0  (0.9-1.6)  











Meds: 


Medications











Generic Name Dose Route Start Last Admin





  Trade Name Freq  PRN Reason Stop Dose Admin


 


Sodium Chloride  10 ml  07/17/19 10:53  07/17/19 11:28





  Saline Flush  FLUSH   10 ml





  ASDIRECTED PRN   Administration





  Keep Vein Open   





     





     





     


 


Sodium Chloride  2.5 ml  07/17/19 10:53  07/17/19 11:28





  Saline Flush  FLUSH   2.5 ml





  ASDIRECTED PRN   Administration





  Keep Vein Open   





     





     





     














Discontinued Medications














Generic Name Dose Route Start Last Admin





  Trade Name Taz  PRN Reason Stop Dose Admin


 


Aspirin  324 mg  07/17/19 10:53  07/17/19 11:28





  Aspirin  PO  07/17/19 10:54  324 mg





  ONETIME ONE   Administration





     





     





     





     


 


Morphine Sulfate  2 mg  07/17/19 11:27  07/17/19 11:33





  Morphine  IVPUSH  07/17/19 11:28  2 mg





  ONETIME ONE   Administration





     





     





     





     














Departure





- Departure


Time of Disposition: 12:07


Disposition: Refer to Observation


Clinical Impression: 


 Chest pain, rule out acute myocardial infarction





Referrals: 


PCP,None [Primary Care Provider] - 


Forms:  ED Department Discharge





- My Orders


Last 24 Hours: 


My Active Orders





07/17/19 10:53


EKG Documentation Completion [RC] STAT 


UA RFX SARAH AND CULT IF INDIC [URIN] Stat 


Sodium Chloride 0.9% [Saline Flush]   10 ml FLUSH ASDIRECTED PRN 


Sodium Chloride 0.9% [Saline Flush]   2.5 ml FLUSH ASDIRECTED PRN 


Saline Lock Insert [OM.PC] Stat 





07/17/19 12:00


Admission Status [Patient Status] [ADT] Stat 














- Assessment/Plan


Last 24 Hours: 


My Active Orders





07/17/19 10:53


EKG Documentation Completion [RC] STAT 


UA RFX SARAH AND CULT IF INDIC [URIN] Stat 


Sodium Chloride 0.9% [Saline Flush]   10 ml FLUSH ASDIRECTED PRN 


Sodium Chloride 0.9% [Saline Flush]   2.5 ml FLUSH ASDIRECTED PRN 


Saline Lock Insert [OM.PC] Stat 





07/17/19 12:00


Admission Status [Patient Status] [ADT] Stat

## 2019-07-18 VITALS — DIASTOLIC BLOOD PRESSURE: 83 MMHG | SYSTOLIC BLOOD PRESSURE: 115 MMHG

## 2019-07-18 RX ADMIN — OMEPRAZOLE SCH MG: 20 CAPSULE, DELAYED RELEASE ORAL at 06:45

## 2019-09-19 ENCOUNTER — HOSPITAL ENCOUNTER (OUTPATIENT)
Dept: HOSPITAL 56 - MW.ED | Age: 54
Setting detail: OBSERVATION
LOS: 1 days | Discharge: HOME | End: 2019-09-20
Attending: INTERNAL MEDICINE | Admitting: INTERNAL MEDICINE
Payer: COMMERCIAL

## 2019-09-19 DIAGNOSIS — Z79.899: ICD-10-CM

## 2019-09-19 DIAGNOSIS — M06.9: ICD-10-CM

## 2019-09-19 DIAGNOSIS — Z91.040: ICD-10-CM

## 2019-09-19 DIAGNOSIS — R07.2: Primary | ICD-10-CM

## 2019-09-19 DIAGNOSIS — Z88.1: ICD-10-CM

## 2019-09-19 DIAGNOSIS — Z91.048: ICD-10-CM

## 2019-09-19 DIAGNOSIS — K21.9: ICD-10-CM

## 2019-09-19 DIAGNOSIS — M79.7: ICD-10-CM

## 2019-09-19 DIAGNOSIS — Z79.1: ICD-10-CM

## 2019-09-19 DIAGNOSIS — I10: ICD-10-CM

## 2019-09-19 DIAGNOSIS — F17.200: ICD-10-CM

## 2019-09-19 LAB
BUN SERPL-MCNC: 13 MG/DL (ref 7–18)
CHLORIDE SERPL-SCNC: 103 MMOL/L (ref 98–107)
CO2 SERPL-SCNC: 25.2 MMOL/L (ref 21–32)
GLUCOSE SERPL-MCNC: 97 MG/DL (ref 74–106)
POTASSIUM SERPL-SCNC: 4.1 MMOL/L (ref 3.5–5.1)
SODIUM SERPL-SCNC: 140 MMOL/L (ref 136–145)

## 2019-09-19 PROCEDURE — 85610 PROTHROMBIN TIME: CPT

## 2019-09-19 PROCEDURE — 84484 ASSAY OF TROPONIN QUANT: CPT

## 2019-09-19 PROCEDURE — 36415 COLL VENOUS BLD VENIPUNCTURE: CPT

## 2019-09-19 PROCEDURE — G0378 HOSPITAL OBSERVATION PER HR: HCPCS

## 2019-09-19 PROCEDURE — 85025 COMPLETE CBC W/AUTO DIFF WBC: CPT

## 2019-09-19 PROCEDURE — 84443 ASSAY THYROID STIM HORMONE: CPT

## 2019-09-19 PROCEDURE — 96361 HYDRATE IV INFUSION ADD-ON: CPT

## 2019-09-19 PROCEDURE — 71045 X-RAY EXAM CHEST 1 VIEW: CPT

## 2019-09-19 PROCEDURE — 93005 ELECTROCARDIOGRAM TRACING: CPT

## 2019-09-19 PROCEDURE — 99285 EMERGENCY DEPT VISIT HI MDM: CPT

## 2019-09-19 PROCEDURE — 96360 HYDRATION IV INFUSION INIT: CPT

## 2019-09-19 PROCEDURE — 80053 COMPREHEN METABOLIC PANEL: CPT

## 2019-09-19 RX ADMIN — OMEPRAZOLE SCH MG: 20 CAPSULE, DELAYED RELEASE ORAL at 22:29

## 2019-09-19 NOTE — EDM.PDOC
ED HPI GENERAL MEDICAL PROBLEM





- General


Chief Complaint: Chest Pain


Stated Complaint: PT HAS CHEST PAINS


Time Seen by Provider: 09/19/19 17:56


Source of Information: Reports: Patient


History Limitations: Reports: No Limitations





- History of Present Illness


INITIAL COMMENTS - FREE TEXT/NARRATIVE: 


HISTORY AND PHYSICAL:





History of present illness:


Patient is a 54-year-old female who presents to the emergency room today with 

complaints of midsternal chest pain that wraps across her anterior chest. She 

states this occurred a few minutes prior to arrival. She states that the pain 

started when getting into her vehicle. Nothing appears to aggravate or 

alleviate her chest pain. She describes it as a heavy pressure. 





Patient denies any fever, chills, headache, change in vision, syncope or near 

syncope. Denies any chest pain, back pain, shortness of breath or cough. Denies 

any abdominal pain, nausea, vomiting, diarrhea, constipation or dysuria. Has 

not noted any blood in urine or stool. Patient has been eating and drinking 

appropriately. Past medical history of hypertension, pericarditis (2009), GERD, 

fibromyalgia and rheumatoid arthritis. She does drink alcohol on a daily basis. 

Denies any drug abuse. 





Review of systems: 


As per history of present illness and below otherwise all systems reviewed and 

negative.





Past medical history: 


As per history of present illness and as reviewed below otherwise 

noncontributory.





Surgical history: 


As per history of present illness and as reviewed below otherwise 

noncontributory.





Social history: 


See social history for further information





Family history: 


As per history of present illness and as reviewed below otherwise 

noncontributory.





Physical exam:


General: Well-developed and well-nourished 54 year old female. Alert and 

oriented. Nontoxic appearing and in no acute distress.


HEENT: Atraumatic, normocephalic, pupils equal and reactive bilaterally, 

negative for conjunctival pallor or scleral icterus, cheeks are flushed 

bilaterally, mucous membranes moist, TMs normal bilaterally, throat clear, neck 

supple, nontender, trachea midline. No drooling or trismus noted. No meningeal 

signs. No hot potato voice noted. 


Lungs: Clear to auscultation, breath sounds equal bilaterally, chest nontender.


Heart: S1S2, regular rate and rhythm without overt murmur


Abdomen: Soft, nondistended, nontender. Negative for masses or 

hepatosplenomegaly. Negative for costovertebral tenderness.


Pelvis: Stable nontender.


Skin: Intact, warm, dry. No lesions or rashes noted.


Extremities: Atraumatic, moves all extremities per self without difficulty or 

deficits, negative for cords or calf pain. Neurovascular unremarkable.


Neuro: Awake, alert, oriented. Cranial nerves II through XII unremarkable. 

Cerebellum unremarkable. Motor and sensory unremarkable throughout. Exam 

nonfocal.





Notes:


Upon nurses entering the room; patient states she is pain free. Nitro on HOLD 

at this time. VSS.


Chest x-ray shows no acute findings. Labs are unremarkable. Pain free at this 

time. All findings were shared with the patient. She states she is at home 

alone and would not feel comfortable being discharged. I agree that she should 

stay for observation. Dr. Maddox was consult did on this case. She will be 

admitted with telemetry to [facility]





Diagnostics:


CBC, CMP, TSH, INR, troponin, EKG, one view chest





Therapeutics:


Aspirin, nitroglycerin, NS at 150 mils per hour





Impression: 


Chest pain rule out





Plan:


Observation admission with telemetry





Definitive disposition and diagnosis as appropriate pending reevaluation and 

review of above.





  ** Mid-Sternal Chest


Pain Score (Numeric/FACES): 7





- Related Data


 Allergies











Allergy/AdvReac Type Severity Reaction Status Date / Time


 


erythromycin base Allergy  Hives Verified 09/19/19 18:12


 


latex Allergy  Rash Verified 09/19/19 18:12


 


Clear Medical Tape Allergy  Rash Uncoded 07/17/19 16:26











Home Meds: 


 Home Meds





Celecoxib [CeleBREX] 200 mg PO DAILY 01/14/17 [History]


Cyclobenzaprine [Flexeril] 2 tab PO BID 01/14/17 [History]


Omeprazole 20 mg PO BID 01/14/17 [History]


Pregabalin [Lyrica] 200 mg PO TID 01/14/17 [History]


amLODIPine [Norvasc] 5 mg PO BEDTIME 01/14/17 [History]


cycloSPORINE [Restasis Multidose]  09/19/19 [History]











Past Medical History


HEENT History: Reports: None, Other (See Below)


Other HEENT History: wears contacts


Cardiovascular History: Reports: Hypertension, Other (See Below)


Other Cardiovascular History: pericarditis 2009


Respiratory History: Reports: None


Gastrointestinal History: Reports: GERD


Genitourinary History: Reports: None


OB/GYN History: Reports: None


Musculoskeletal History: Reports: Fibromyalgia, Osteoarthritis, RA


Neurological History: Reports: None


Psychiatric History: Reports: None


Endocrine/Metabolic History: Reports: None


Hematologic History: Reports: None


Immunologic History: Reports: None


Oncologic (Cancer) History: Reports: None


Dermatologic History: Reports: None


Other Dermatologic History: Raynaud's Syndrome





- Infectious Disease History


Infectious Disease History: Reports: Chicken Pox





- Past Surgical History


Head Surgeries/Procedures: Reports: None


Cardiovascular Surgical History: Reports: None


Female  Surgical History: Reports: Hysterectomy, Other (See Below)


Other Female  Surgeries/Procedures: has left ovary


Neurological Surgical History: Reports: Other (See Below)


Other Neurological Surgeries/Procedures: Raynaud's Syndrome


Musculoskeletal Surgical History: Reports: Other (See Below)


Dermatological Surgical History: Reports: None





Social & Family History





- Family History


Family Medical History: Noncontributory





- Caffeine Use


Caffeine Use: Reports: Coffee





- Living Situation & Occupation


Living situation: Reports: 


Occupation: Employed





ED ROS GENERAL





- Review of Systems


Review Of Systems: ROS reveals no pertinent complaints other than HPI.





ED EXAM, GENERAL





- Physical Exam


Exam: See Below (See dictation)





Course





- Vital Signs


Last Recorded V/S: 


 Last Vital Signs











Temp  98.4 F   09/19/19 18:03


 


Pulse  89   09/19/19 18:54


 


Resp  16   09/19/19 18:54


 


BP  132/93 H  09/19/19 18:54


 


Pulse Ox  95   09/19/19 18:54














- Orders/Labs/Meds


Orders: 


 Active Orders 24 hr











 Category Date Time Status


 


 Admission Status [Patient Status] [ADT] Stat ADT  09/19/19 19:14 Ordered


 


 EKG Documentation Completion [RC] STAT Care  09/19/19 17:57 Active


 


 Nitroglycerin [Nitrostat] Med  09/19/19 17:58 Active





 0.4 mg SL Q5M PRN   


 


 Sodium Chloride 0.9% [Normal Saline] 1,000 ml Med  09/19/19 17:58 Active





 IV STAT   


 


 Sodium Chloride 0.9% [Saline Flush] Med  09/19/19 17:57 Active





 10 ml FLUSH ASDIRECTED PRN   


 


 Sodium Chloride 0.9% [Saline Flush] Med  09/19/19 17:57 Active





 2.5 ml FLUSH ASDIRECTED PRN   


 


 Saline Lock Insert [OM.PC] Stat Oth  09/19/19 17:57 Ordered








 Medication Orders





Sodium Chloride (Normal Saline)  1,000 mls @ 150 mls/hr IV STAT ONE


   Stop: 09/20/19 00:37


   Last Admin: 09/19/19 18:09  Dose: 150 mls/hr


Nitroglycerin (Nitrostat)  0.4 mg SL Q5M PRN


   PRN Reason: Chest Pain


Sodium Chloride (Saline Flush)  10 ml FLUSH ASDIRECTED PRN


   PRN Reason: Keep Vein Open


Sodium Chloride (Saline Flush)  2.5 ml FLUSH ASDIRECTED PRN


   PRN Reason: Keep Vein Open








Labs: 


 Laboratory Tests











  09/19/19 09/19/19 09/19/19 Range/Units





  18:18 18:18 18:18 


 


WBC  6.01    (4.0-11.0)  K/uL


 


RBC  4.34    (4.30-5.90)  M/uL


 


Hgb  13.9    (12.0-16.0)  g/dL


 


Hct  40.7    (36.0-46.0)  %


 


MCV  93.8    (80.0-98.0)  fL


 


MCH  32.0    (27.0-32.0)  pg


 


MCHC  34.2    (31.0-37.0)  g/dL


 


RDW Std Deviation  46.9    (28.0-62.0)  fl


 


RDW Coeff of Inocente  14    (11.0-15.0)  %


 


Plt Count  207    (150-400)  K/uL


 


MPV  10.20    (7.40-12.00)  fL


 


Neut % (Auto)  43.4 L    (48.0-80.0)  %


 


Lymph % (Auto)  44.8 H    (16.0-40.0)  %


 


Mono % (Auto)  9.2    (0.0-15.0)  %


 


Eos % (Auto)  1.8    (0.0-7.0)  %


 


Baso % (Auto)  0.8    (0.0-1.5)  %


 


Neut # (Auto)  2.6    (1.4-5.7)  K/uL


 


Lymph # (Auto)  2.7 H    (0.6-2.4)  K/uL


 


Mono # (Auto)  0.6    (0.0-0.8)  K/uL


 


Eos # (Auto)  0.1    (0.0-0.7)  K/uL


 


Baso # (Auto)  0.1    (0.0-0.1)  K/uL


 


Nucleated RBC %  0.0    /100WBC


 


Nucleated RBCs #  0    K/uL


 


INR   0.98   


 


Sodium    140  (136-145)  mmol/L


 


Potassium    4.1  (3.5-5.1)  mmol/L


 


Chloride    103  ()  mmol/L


 


Carbon Dioxide    25.2  (21.0-32.0)  mmol/L


 


BUN    13  (7.0-18.0)  mg/dL


 


Creatinine    0.8  (0.6-1.0)  mg/dL


 


Est Cr Clr Drug Dosing    76.36  mL/min


 


Estimated GFR (MDRD)    > 60.0  ml/min


 


Glucose    97  ()  mg/dL


 


Calcium    10.1  (8.5-10.1)  mg/dL


 


Total Bilirubin    0.6  (0.2-1.0)  mg/dL


 


AST    36  (15-37)  IU/L


 


ALT    61  (14-63)  IU/L


 


Alkaline Phosphatase    68  ()  U/L


 


Troponin I    < 0.050  (0.000-0.056)  ng/mL


 


Total Protein    7.6  (6.4-8.2)  g/dL


 


Albumin    3.9  (3.4-5.0)  g/dL


 


Globulin    3.7  (2.6-4.0)  g/dL


 


Albumin/Globulin Ratio    1.1  (0.9-1.6)  


 


TSH 3rd Generation    2.33  (0.36-3.74)  uIU/mL











Meds: 


Medications











Generic Name Dose Route Start Last Admin





  Trade Name Freq  PRN Reason Stop Dose Admin


 


Sodium Chloride  1,000 mls @ 150 mls/hr  09/19/19 17:58  09/19/19 18:09





  Normal Saline  IV  09/20/19 00:37  150 mls/hr





  STAT ONE   Administration





     





     





     





     


 


Nitroglycerin  0.4 mg  09/19/19 17:58  





  Nitrostat  SL   





  Q5M PRN   





  Chest Pain   





     





     





     


 


Sodium Chloride  10 ml  09/19/19 17:57  





  Saline Flush  FLUSH   





  ASDIRECTED PRN   





  Keep Vein Open   





     





     





     


 


Sodium Chloride  2.5 ml  09/19/19 17:57  





  Saline Flush  FLUSH   





  ASDIRECTED PRN   





  Keep Vein Open   





     





     





     














Discontinued Medications














Generic Name Dose Route Start Last Admin





  Trade Name Freq  PRN Reason Stop Dose Admin


 


Aspirin  324 mg  09/19/19 17:57  09/19/19 18:08





  Aspirin  PO  09/19/19 17:58  324 mg





  ONETIME ONE   Administration





     





     





     





     














Departure





- Departure


Time of Disposition: 19:16


Disposition: Refer to Observation


Clinical Impression: 


 Chest pain, rule out acute myocardial infarction





Referrals: 


PCP,None [Primary Care Provider] - 


Forms:  ED Department Discharge





- My Orders


Last 24 Hours: 


My Active Orders





09/19/19 17:57


EKG Documentation Completion [RC] STAT 


Sodium Chloride 0.9% [Saline Flush]   10 ml FLUSH ASDIRECTED PRN 


Sodium Chloride 0.9% [Saline Flush]   2.5 ml FLUSH ASDIRECTED PRN 


Saline Lock Insert [OM.PC] Stat 





09/19/19 17:58


Nitroglycerin [Nitrostat]   0.4 mg SL Q5M PRN 


Sodium Chloride 0.9% [Normal Saline] 1,000 ml IV STAT 





09/19/19 19:14


Admission Status [Patient Status] [ADT] Stat 














- Assessment/Plan


Last 24 Hours: 


My Active Orders





09/19/19 17:57


EKG Documentation Completion [RC] STAT 


Sodium Chloride 0.9% [Saline Flush]   10 ml FLUSH ASDIRECTED PRN 


Sodium Chloride 0.9% [Saline Flush]   2.5 ml FLUSH ASDIRECTED PRN 


Saline Lock Insert [OM.PC] Stat 





09/19/19 17:58


Nitroglycerin [Nitrostat]   0.4 mg SL Q5M PRN 


Sodium Chloride 0.9% [Normal Saline] 1,000 ml IV STAT 





09/19/19 19:14


Admission Status [Patient Status] [ADT] Stat

## 2019-09-19 NOTE — PCM.HP.2
H&P History of Present Illness





- General


Date of Service: 09/19/19


Admit Problem/Dx: 


 Admission Diagnosis/Problem





Admission Diagnosis/Problem      Chest pain, rule out acute myocardial 

infarction











- History of Present Illness


Initial Comments - Free Text/Narative: 





53 yo female with pmh of polyarthritis who presented with 30 minutes of chest 

pain.  She describes the pain as sharp substernal as well as pressure.  She has 

never had this pain before.  She denies any shortness of breath or diaphoresis.

  The pain occurred will driving home from work and she decided to drive to the 

ED as she was afraid of being home alone. The pain resolved by the time she was 

in the ED.


  ** Mid-Sternal Chest


Pain Score (Numeric/FACES): 7





- Related Data


Allergies/Adverse Reactions: 


 Allergies











Allergy/AdvReac Type Severity Reaction Status Date / Time


 


erythromycin base Allergy  Hives Verified 09/19/19 21:01


 


latex Allergy  Rash Verified 09/19/19 21:01


 


Clear Medical Tape Allergy  Rash Uncoded 09/19/19 21:01











Home Medications: 


 Home Meds





Celecoxib [CeleBREX] 200 mg PO BEDTIME 01/14/17 [History]


Cyclobenzaprine [Flexeril] 10 mg PO BID 01/14/17 [History]


Omeprazole 20 mg PO BID 01/14/17 [History]


Pregabalin [Lyrica] 200 mg PO TID 01/14/17 [History]


amLODIPine [Norvasc] 5 mg PO BEDTIME 01/14/17 [History]


Acetaminophen [Tylenol Extra Strength] 1,000 mg PO DAILY PRN 09/19/19 [History]


cycloSPORINE [Restasis Multidose] 1 drop EYEBOTH BID 09/19/19 [History]











Past Medical History


HEENT History: Reports: None, Other (See Below)


Other HEENT History: wears contacts


Cardiovascular History: Reports: Hypertension, Other (See Below)


Other Cardiovascular History: pericarditis 2009


Respiratory History: Reports: None


Gastrointestinal History: Reports: GERD


Genitourinary History: Reports: None


OB/GYN History: Reports: None


Musculoskeletal History: Reports: Fibromyalgia, Osteoarthritis, RA


Neurological History: Reports: None


Psychiatric History: Reports: None


Endocrine/Metabolic History: Reports: None


Hematologic History: Reports: None


Immunologic History: Reports: None


Oncologic (Cancer) History: Reports: None


Dermatologic History: Reports: None


Other Dermatologic History: Raynaud's Syndrome





- Infectious Disease History


Infectious Disease History: Reports: Chicken Pox





- Past Surgical History


Head Surgeries/Procedures: Reports: None


Cardiovascular Surgical History: Reports: None


Female  Surgical History: Reports: Hysterectomy, Other (See Below)


Other Female  Surgeries/Procedures: has left ovary


Neurological Surgical History: Reports: Other (See Below)


Other Neurological Surgeries/Procedures: Raynaud's Syndrome


Musculoskeletal Surgical History: Reports: Other (See Below)


Dermatological Surgical History: Reports: None





Social & Family History





- Family History


Family Medical History: Noncontributory





- Tobacco Use


Smoking Status *Q: Current Every Day Smoker


Years of Tobacco use: 39


Packs/Tins Daily: 1


Used Tobacco, but Quit: No


Second Hand Smoke Exposure: Yes





- Caffeine Use


Caffeine Use: Reports: Coffee





- Alcohol Use


Days Per Week of Alcohol Use: 5


Number of Drinks Per Day: 2


Total Drinks Per Week: 10


Date of Last Drink: 09/18/19





- Recreational Drug Use


Recreational Drug Use: No





- Living Situation & Occupation


Living situation: Reports: 


Occupation: Employed





H&P Review of Systems





- Review of Systems:


Review Of Systems: ROS reveals no pertinent complaints other than HPI.





Exam





- Exam


Exam: See Below





- Vital Signs


Vital Signs: 


 Last Vital Signs











Temp  36.9 C   09/19/19 18:03


 


Pulse  89   09/19/19 18:54


 


Resp  16   09/19/19 18:54


 


BP  132/93 H  09/19/19 18:54


 


Pulse Ox  95   09/19/19 18:54











Weight: 88.507 kg





- Exam


General: Alert, Oriented


Lungs: Clear to Auscultation, Normal Respiratory Effort


Cardiovascular: Regular Rate, Regular Rhythm


GI/Abdominal Exam: Soft, Non-Tender


Extremities: Non-Tender, No Pedal Edema


Skin: Warm, Dry, Intact


Neurological: Cranial Nerves Intact, Reflexes Equal Bilateral





- Patient Data


Lab Results Last 24 hrs: 


 Laboratory Results - last 24 hr











  09/19/19 09/19/19 09/19/19 Range/Units





  18:18 18:18 18:18 


 


WBC  6.01    (4.0-11.0)  K/uL


 


RBC  4.34    (4.30-5.90)  M/uL


 


Hgb  13.9    (12.0-16.0)  g/dL


 


Hct  40.7    (36.0-46.0)  %


 


MCV  93.8    (80.0-98.0)  fL


 


MCH  32.0    (27.0-32.0)  pg


 


MCHC  34.2    (31.0-37.0)  g/dL


 


RDW Std Deviation  46.9    (28.0-62.0)  fl


 


RDW Coeff of Inocente  14    (11.0-15.0)  %


 


Plt Count  207    (150-400)  K/uL


 


MPV  10.20    (7.40-12.00)  fL


 


Neut % (Auto)  43.4 L    (48.0-80.0)  %


 


Lymph % (Auto)  44.8 H    (16.0-40.0)  %


 


Mono % (Auto)  9.2    (0.0-15.0)  %


 


Eos % (Auto)  1.8    (0.0-7.0)  %


 


Baso % (Auto)  0.8    (0.0-1.5)  %


 


Neut # (Auto)  2.6    (1.4-5.7)  K/uL


 


Lymph # (Auto)  2.7 H    (0.6-2.4)  K/uL


 


Mono # (Auto)  0.6    (0.0-0.8)  K/uL


 


Eos # (Auto)  0.1    (0.0-0.7)  K/uL


 


Baso # (Auto)  0.1    (0.0-0.1)  K/uL


 


Nucleated RBC %  0.0    /100WBC


 


Nucleated RBCs #  0    K/uL


 


INR   0.98   


 


Sodium    140  (136-145)  mmol/L


 


Potassium    4.1  (3.5-5.1)  mmol/L


 


Chloride    103  ()  mmol/L


 


Carbon Dioxide    25.2  (21.0-32.0)  mmol/L


 


BUN    13  (7.0-18.0)  mg/dL


 


Creatinine    0.8  (0.6-1.0)  mg/dL


 


Est Cr Clr Drug Dosing    76.36  mL/min


 


Estimated GFR (MDRD)    > 60.0  ml/min


 


Glucose    97  ()  mg/dL


 


Calcium    10.1  (8.5-10.1)  mg/dL


 


Total Bilirubin    0.6  (0.2-1.0)  mg/dL


 


AST    36  (15-37)  IU/L


 


ALT    61  (14-63)  IU/L


 


Alkaline Phosphatase    68  ()  U/L


 


Troponin I    < 0.050  (0.000-0.056)  ng/mL


 


Total Protein    7.6  (6.4-8.2)  g/dL


 


Albumin    3.9  (3.4-5.0)  g/dL


 


Globulin    3.7  (2.6-4.0)  g/dL


 


Albumin/Globulin Ratio    1.1  (0.9-1.6)  


 


TSH 3rd Generation    2.33  (0.36-3.74)  uIU/mL











Result Diagrams: 


 09/19/19 18:18





 09/19/19 18:18


Problem List Initiated/Reviewed/Updated: Yes


Orders Last 24hrs: 


 Active Orders 24 hr











 Category Date Time Status


 


 Admission Status [Patient Status] [ADT] Stat ADT  09/19/19 19:14 Active


 


 EKG Documentation Completion [RC] STAT Care  09/19/19 17:57 Active


 


 TROPONIN I [CHEM] Q6H Lab  09/20/19 00:00 Ordered


 


 TROPONIN I [CHEM] Q6H Lab  09/20/19 06:00 Ordered


 


 Cyclobenzaprine [Flexeril] Med  09/19/19 21:00 Active





 20 mg PO BID   


 


 Nitroglycerin [Nitrostat] Med  09/19/19 17:58 Active





 0.4 mg SL Q5M PRN   


 


 Omeprazole Med  09/19/19 19:45 Active





 20 mg PO BIDAC   


 


 Pregabalin [Lyrica] Med  09/19/19 22:00 Active





 200 mg PO TID   


 


 Sodium Chloride 0.9% [Normal Saline] 1,000 ml Med  09/19/19 17:58 Active





 IV STAT   


 


 Sodium Chloride 0.9% [Saline Flush] Med  09/19/19 17:57 Active





 10 ml FLUSH ASDIRECTED PRN   


 


 Sodium Chloride 0.9% [Saline Flush] Med  09/19/19 17:57 Active





 2.5 ml FLUSH ASDIRECTED PRN   


 


 amLODIPine [Norvasc] Med  09/19/19 21:00 Active





 5 mg PO BEDTIME   


 


 cycloSPORINE [Restasis Multidose] Med  09/19/19 19:45 Unverified





 DOSE UNIT RTE FREQ   


 


 Saline Lock Insert [OM.PC] Stat Oth  09/19/19 17:57 Ordered








 Medication Orders





Amlodipine Besylate (Norvasc)  5 mg PO BEDTIME PILO


Cyclobenzaprine HCl (Flexeril)  20 mg PO BID PILO


Sodium Chloride (Normal Saline)  1,000 mls @ 150 mls/hr IV STAT ONE


   Stop: 09/20/19 00:37


   Last Admin: 09/19/19 18:09  Dose: 150 mls/hr


Nitroglycerin (Nitrostat)  0.4 mg SL Q5M PRN


   PRN Reason: Chest Pain


Omeprazole (Omeprazole)  20 mg PO BIDAC PILO


Pregabalin (Lyrica)  200 mg PO TID PILO


Sodium Chloride (Saline Flush)  10 ml FLUSH ASDIRECTED PRN


   PRN Reason: Keep Vein Open


Sodium Chloride (Saline Flush)  2.5 ml FLUSH ASDIRECTED PRN


   PRN Reason: Keep Vein Open








Assessment/Plan Comment:: 





53 yo female admitted for chest pain rule out.  We will monitor on telemetry 

and trend cardiac enzymes.

## 2019-09-19 NOTE — CR
Indication:



Chest pain.



Technique:



A single AP portable view of the chest was obtained.



Comparison:



July 17, 2019. 



Findings:



The heart is borderline in size. The lungs are clear. No infiltrate, 

pleural effusion, or pneumothorax is identified.



Impression:



Stable chest x-ray.



Dictated by Naomie Barbosa MD @ Sep 19 2019  6:37PM



Signed by Dr. Naomie Barbosa @ Sep 19 2019  6:38PM

## 2019-09-20 VITALS — SYSTOLIC BLOOD PRESSURE: 121 MMHG | DIASTOLIC BLOOD PRESSURE: 80 MMHG | HEART RATE: 67 BPM

## 2019-09-20 RX ADMIN — OMEPRAZOLE SCH MG: 20 CAPSULE, DELAYED RELEASE ORAL at 06:33

## 2019-09-20 NOTE — PCM.DCSUM1
<Latricia Anne - Last Filed: 09/20/19 11:17>





**Discharge Summary





- Hospital Course


HPI Initial Comments: 


Admission Date: 8/19/19


Discharge Date: 8/20/19





Admission Diagnosis:


1. Chest pain, ACS rule out


2. Chronic conditions- HTN, RA, GERD, fibromyalgia





Discharge Diagnosis:


1. Chest pain, ACS rule out


2. Chronic conditions- HTN, RA, GERD, fibromyalgia





Procedures: None





Consults: None





Hospital Course: This is a 56-year-old female with past medical history of 

hypertension, pericarditis 2009, GERD, fibromyalgia, rheumatoid arthritis who 

presented to the ER with chest pain.  She reported the chest pain came on while 

driving lasted a few minutes but resolved before arrival in the ED. In the ER, 

work up did no show any leukocytosis, anemia,  or electrolytes abnormalities, 

and TSH was within normal limits.  Initial troponin was negative. EKG showed 

normal sinus rhythm without ischemic changes.  CXR showed no acute 

cardiopulmonary process.  She was admitted to the medical surgical floor for 

observation.  Her troponins were trended and negative x 3.  She was monitored 

on telemetry without any significant events.  She did not experience any 

additional episodes of chest pain.  She is scheduled to have a stress test 

performed in [place] in November as part of her health insurance work up.  Her 

last stress test was 2 years ago and she reports it was normal.  She was 

continued on her home meds for her chronic conditions.  By day of discharge, 

patient stated she felt better and was ready to go home. 





Disposition: Home





Discharge Condition:  vitals stable, tolerating oral diet, ambulating without 

difficulty, symptom improvement





Discharge Instructions: heart healthy diet as tolerated, activity as tolerated, 

take medications as prescribed.  Symptoms to report to physician include fever/

chills, chest pain, shortness of breath, abdominal pain, erythema,  drainage/

discharge, or not improving as expected.





Discharge Medications:


Celecoxib [CeleBREX] 200 mg PO BEDTIME


Cyclobenzaprine [Flexeril] 10 mg PO BID


Omeprazole 20 mg PO BID 


Pregabalin [Lyrica] 200 mg PO TID 


amLODIPine [Norvasc] 5 mg PO BEDTIME


Acetaminophen [Tylenol Extra Strength] 1,000 mg PO DAILY PRN 


cycloSPORINE [Restasis Multidose] 1 drop EYEBOTH BID 








Follow-up:


1. PCP- Dr. Marshall


2. Outpatient stress test














- Discharge Data


Discharge Date: 09/20/19


Discharge Disposition: Home, Self-Care 01


Condition: Stable





- Referral to Home Health


Primary Care Physician: 


PCP None








- Discharge Plan


*PRESCRIPTION DRUG MONITORING PROGRAM REVIEWED*: No


*COPY OF PRESCRIPTION DRUG MONITORING REPORT IN PATIENT [place]: No


Home Medications: 


 Home Meds





Celecoxib [CeleBREX] 200 mg PO BEDTIME 01/14/17 [History]


Cyclobenzaprine [Flexeril] 10 mg PO BID 01/14/17 [History]


Omeprazole 20 mg PO BID 01/14/17 [History]


Pregabalin [Lyrica] 200 mg PO TID 01/14/17 [History]


amLODIPine [Norvasc] 5 mg PO BEDTIME 01/14/17 [History]


Acetaminophen [Tylenol Extra Strength] 1,000 mg PO DAILY PRN 09/19/19 [History]


cycloSPORINE [Restasis Multidose] 1 drop EYEBOTH BID 09/19/19 [History]








Patient Handouts:  Nonspecific Chest Pain, Easy-to-Read


Referrals: 


[facility] [Outside]


()


Wale Marshall MD [Physician] - 09/27/19 10:00 am (Arrive 15 minutes early with 

photo ID and insurance card. If you are not early they will not see you. )





- Discharge Summary/Plan Comment


DC Time >30 min.: No





- Patient Data


Vitals - Most Recent: 


 Last Vital Signs











Temp  97.2 F   09/20/19 07:44


 


Pulse  62   09/20/19 07:44


 


Resp  16   09/20/19 07:44


 


BP  126/87   09/20/19 07:44


 


Pulse Ox  97   09/20/19 07:44











Weight - Most Recent: 88.507 kg


I&O - Last 24 hours: 


 Intake & Output











 09/19/19 09/20/19 09/20/19





 22:59 06:59 14:59


 


Intake Total  1100 


 


Output Total  1950 


 


Balance  -850 











Lab Results - Last 24 hrs: 


 Laboratory Results - last 24 hr











  09/19/19 09/19/19 09/19/19 Range/Units





  18:18 18:18 18:18 


 


WBC  6.01    (4.0-11.0)  K/uL


 


RBC  4.34    (4.30-5.90)  M/uL


 


Hgb  13.9    (12.0-16.0)  g/dL


 


Hct  40.7    (36.0-46.0)  %


 


MCV  93.8    (80.0-98.0)  fL


 


MCH  32.0    (27.0-32.0)  pg


 


MCHC  34.2    (31.0-37.0)  g/dL


 


RDW Std Deviation  46.9    (28.0-62.0)  fl


 


RDW Coeff of Inocente  14    (11.0-15.0)  %


 


Plt Count  207    (150-400)  K/uL


 


MPV  10.20    (7.40-12.00)  fL


 


Neut % (Auto)  43.4 L    (48.0-80.0)  %


 


Lymph % (Auto)  44.8 H    (16.0-40.0)  %


 


Mono % (Auto)  9.2    (0.0-15.0)  %


 


Eos % (Auto)  1.8    (0.0-7.0)  %


 


Baso % (Auto)  0.8    (0.0-1.5)  %


 


Neut # (Auto)  2.6    (1.4-5.7)  K/uL


 


Lymph # (Auto)  2.7 H    (0.6-2.4)  K/uL


 


Mono # (Auto)  0.6    (0.0-0.8)  K/uL


 


Eos # (Auto)  0.1    (0.0-0.7)  K/uL


 


Baso # (Auto)  0.1    (0.0-0.1)  K/uL


 


Nucleated RBC %  0.0    /100WBC


 


Nucleated RBCs #  0    K/uL


 


INR   0.98   


 


Sodium    140  (136-145)  mmol/L


 


Potassium    4.1  (3.5-5.1)  mmol/L


 


Chloride    103  ()  mmol/L


 


Carbon Dioxide    25.2  (21.0-32.0)  mmol/L


 


BUN    13  (7.0-18.0)  mg/dL


 


Creatinine    0.8  (0.6-1.0)  mg/dL


 


Est Cr Clr Drug Dosing    76.36  mL/min


 


Estimated GFR (MDRD)    > 60.0  ml/min


 


Glucose    97  ()  mg/dL


 


Calcium    10.1  (8.5-10.1)  mg/dL


 


Total Bilirubin    0.6  (0.2-1.0)  mg/dL


 


AST    36  (15-37)  IU/L


 


ALT    61  (14-63)  IU/L


 


Alkaline Phosphatase    68  ()  U/L


 


Troponin I    < 0.050  (0.000-0.056)  ng/mL


 


Total Protein    7.6  (6.4-8.2)  g/dL


 


Albumin    3.9  (3.4-5.0)  g/dL


 


Globulin    3.7  (2.6-4.0)  g/dL


 


Albumin/Globulin Ratio    1.1  (0.9-1.6)  


 


TSH 3rd Generation    2.33  (0.36-3.74)  uIU/mL














  09/20/19 09/20/19 Range/Units





  00:18 06:12 


 


WBC    (4.0-11.0)  K/uL


 


RBC    (4.30-5.90)  M/uL


 


Hgb    (12.0-16.0)  g/dL


 


Hct    (36.0-46.0)  %


 


MCV    (80.0-98.0)  fL


 


MCH    (27.0-32.0)  pg


 


MCHC    (31.0-37.0)  g/dL


 


RDW Std Deviation    (28.0-62.0)  fl


 


RDW Coeff of Inocente    (11.0-15.0)  %


 


Plt Count    (150-400)  K/uL


 


MPV    (7.40-12.00)  fL


 


Neut % (Auto)    (48.0-80.0)  %


 


Lymph % (Auto)    (16.0-40.0)  %


 


Mono % (Auto)    (0.0-15.0)  %


 


Eos % (Auto)    (0.0-7.0)  %


 


Baso % (Auto)    (0.0-1.5)  %


 


Neut # (Auto)    (1.4-5.7)  K/uL


 


Lymph # (Auto)    (0.6-2.4)  K/uL


 


Mono # (Auto)    (0.0-0.8)  K/uL


 


Eos # (Auto)    (0.0-0.7)  K/uL


 


Baso # (Auto)    (0.0-0.1)  K/uL


 


Nucleated RBC %    /100WBC


 


Nucleated RBCs #    K/uL


 


INR    


 


Sodium    (136-145)  mmol/L


 


Potassium    (3.5-5.1)  mmol/L


 


Chloride    ()  mmol/L


 


Carbon Dioxide    (21.0-32.0)  mmol/L


 


BUN    (7.0-18.0)  mg/dL


 


Creatinine    (0.6-1.0)  mg/dL


 


Est Cr Clr Drug Dosing    mL/min


 


Estimated GFR (MDRD)    ml/min


 


Glucose    ()  mg/dL


 


Calcium    (8.5-10.1)  mg/dL


 


Total Bilirubin    (0.2-1.0)  mg/dL


 


AST    (15-37)  IU/L


 


ALT    (14-63)  IU/L


 


Alkaline Phosphatase    ()  U/L


 


Troponin I  < 0.050  < 0.050  (0.000-0.056)  ng/mL


 


Total Protein    (6.4-8.2)  g/dL


 


Albumin    (3.4-5.0)  g/dL


 


Globulin    (2.6-4.0)  g/dL


 


Albumin/Globulin Ratio    (0.9-1.6)  


 


TSH 3rd Generation    (0.36-3.74)  uIU/mL











Med Orders - Current: 


 Current Medications





Acetaminophen (Tylenol)  650 mg PO Q4H PRN


   PRN Reason: Headache/Pain


   Last Admin: 09/20/19 04:06 Dose:  650 mg


Amlodipine Besylate (Norvasc)  5 mg PO BEDTIME [facility]


   Last Admin: 09/19/19 22:30 Dose:  5 mg


Cyclobenzaprine HCl (Flexeril)  20 mg PO BID [facility]


   Last Admin: 09/19/19 22:29 Dose:  20 mg


Nitroglycerin (Nitrostat)  0.4 mg SL Q5M PRN


   PRN Reason: Chest Pain


[facility]


   Last Admin: 09/20/19 06:33 Dose:  20 mg


Cyclosporine [ Restasis Multidose] 1 Drop  1 each EYEBOTH BID [facility]


Pregabalin (Lyrica)  200 mg PO TID [facility]


   Last Admin: 09/20/19 06:32 Dose:  200 mg


Sodium Chloride (Saline Flush)  10 ml FLUSH ASDIRECTED PRN


   PRN Reason: Keep Vein Open


Sodium Chloride (Saline Flush)  2.5 ml FLUSH ASDIRECTED PRN


   PRN Reason: Keep Vein Open





Discontinued Medications





Aspirin (Aspirin)  324 mg PO ONETIME ONE


   Stop: 09/19/19 17:58


   Last Admin: 09/19/19 18:08 Dose:  324 mg


Sodium Chloride (Normal Saline)  1,000 mls @ 150 mls/hr IV STAT ONE


   Stop: 09/20/19 00:37


   Last Admin: 09/19/19 18:09 Dose:  150 mls/hr


Pregabalin (Lyrica)  200 mg PO TID [facility]


   Last Admin: 09/19/19 23:43 Dose:  Not Given











<Charli Maddox - Last Filed: 09/20/19 18:52>





**Discharge Summary





- Referral to Home Health


Primary Care Physician: 


PCP None








- Patient Data


Vitals - Most Recent: 


 Last Vital Signs











Temp  36.6 C   09/20/19 12:00


 


Pulse  67   09/20/19 12:00


 


Resp  16   09/20/19 12:00


 


BP  121/80   09/20/19 12:00


 


Pulse Ox  96   09/20/19 12:00











I&O - Last 24 hours: 


 Intake & Output











 09/20/19 09/20/19 09/20/19





 06:59 14:59 22:59


 


Intake Total 1100 1240 


 


Output Total 1950 2100 


 


Balance -850 -860 











Lab Results - Last 24 hrs: 


 Laboratory Results - last 24 hr











  09/19/19 09/20/19 09/20/19 Range/Units





  18:18 00:18 06:12 


 


Sodium  140    (136-145)  mmol/L


 


Potassium  4.1    (3.5-5.1)  mmol/L


 


Chloride  103    ()  mmol/L


 


Carbon Dioxide  25.2    (21.0-32.0)  mmol/L


 


BUN  13    (7.0-18.0)  mg/dL


 


Creatinine  0.8    (0.6-1.0)  mg/dL


 


Est Cr Clr Drug Dosing  76.36    mL/min


 


Estimated GFR (MDRD)  > 60.0    ml/min


 


Glucose  97    ()  mg/dL


 


Calcium  10.1    (8.5-10.1)  mg/dL


 


Total Bilirubin  0.6    (0.2-1.0)  mg/dL


 


AST  36    (15-37)  IU/L


 


ALT  61    (14-63)  IU/L


 


Alkaline Phosphatase  68    ()  U/L


 


Troponin I  < 0.050  < 0.050  < 0.050  (0.000-0.056)  ng/mL


 


Total Protein  7.6    (6.4-8.2)  g/dL


 


Albumin  3.9    (3.4-5.0)  g/dL


 


Globulin  3.7    (2.6-4.0)  g/dL


 


Albumin/Globulin Ratio  1.1    (0.9-1.6)  


 


TSH 3rd Generation  2.33    (0.36-3.74)  uIU/mL











Med Orders - Current: 


 Current Medications








Discontinued Medications





Acetaminophen (Tylenol)  650 mg PO Q4H PRN


   PRN Reason: Headache/Pain


   Last Admin: 09/20/19 04:06 Dose:  650 mg


Amlodipine Besylate (Norvasc)  5 mg PO BEDTIME [facility]


   Last Admin: 09/19/19 22:30 Dose:  5 mg


Aspirin (Aspirin)  324 mg PO ONETIME ONE


   Stop: 09/19/19 17:58


   Last Admin: 09/19/19 18:08 Dose:  324 mg


Cyclobenzaprine HCl (Flexeril)  20 mg PO BID [facility]


   Last Admin: 09/20/19 08:24 Dose:  20 mg


Sodium Chloride (Normal Saline)  1,000 mls @ 150 mls/hr IV STAT ONE


   Stop: 09/20/19 00:37


   Last Admin: 09/19/19 18:09 Dose:  150 mls/hr


Nitroglycerin (Nitrostat)  0.4 mg SL Q5M PRN


   PRN Reason: Chest Pain


Omeprazole (Omeprazole)  20 mg PO BIDAC [facility]


   Last Admin: 09/20/19 06:33 Dose:  20 mg


Cyclosporine [ Restasis Multidose] 1 Drop  1 each EYEBOTH BID [facility]


   Last Admin: 09/20/19 08:25 Dose:  Not Given


Pregabalin (Lyrica)  200 mg PO TID [facility]


   Last Admin: 09/19/19 23:43 Dose:  Not Given


Pregabalin (Lyrica)  200 mg PO TID [facility]


   Last Admin: 09/20/19 06:32 Dose:  200 mg


Sodium Chloride (Saline Flush)  10 ml FLUSH ASDIRECTED PRN


   PRN Reason: Keep Vein Open


Sodium Chloride (Saline Flush)  2.5 ml FLUSH ASDIRECTED PRN


   PRN Reason: Keep Vein Open











- Free Text/Narrative


Note: 





I have seen and evaluated the patient with the resident.  I have discussed 

findings and treatment plan with the resident.  I agree with the assessment and 

plan outlined in the following note.

## 2021-06-15 NOTE — EDM.PDOC
ED HPI GENERAL MEDICAL PROBLEM





- General


Chief Complaint: Upper Extremity Injury/Pain


Stated Complaint: RA FLAREUP IN LEFT WRIST


Time Seen by Provider: 06/15/21 07:59





- History of Present Illness


INITIAL COMMENTS - FREE TEXT/NARRATIVE: 





CHIEF COMPLAINT(S): Left wrist pain








HISTORY OF PRESENT ILLNESS: This is a 55-year-old woman with a past medical 

history of rheumatoid arthritis who comes to the emergency department with a chi

ef complaint of left wrist pain and swelling.  The patient states that over the 

last week she has been experiencing left wrist pain and swelling.  She describes

her pain as throbbing rated 10 out of 10 which is worse with movement.  She 

denies any fevers, chills, redness or warmth.  She states that she can use her 

left wrist and has full range of movement.  She states that she has an 

appointment in 1 week to get an injection.  She states that this is similar to 

the past and has similar swelling approximately 1-1/2 years ago.  She states 

that she has been trying Tylenol arthritis, elevation and ice.  She states that 

the elevation and icing the wrist do help.  She denies any numbness, tingling or

weakness.  She denies any injury to her wrist.  She states that this usually 

happens when he gets rainy and humid.





 


REVIEW OF SYSTEMS: 





Constitutional: Denies fever, chills.


Eyes: Denies eye pain


Ears, Nose, Mouth, & Throat: Denies earache


Cardiovascular: Denies chest pain


Respiratory: Denies shortness of breath


Gastrointestinal: Denies Nausea, vomiting, diarrhea, hematochezia.


Genitourinary: Denies hematuria


Skin:Denies a rash


MSK: Positive for left wrist pain and swelling


Neurological: Denies blurred vision, numbness, tingling, weakness


Psychiatric: Denies depression








PAST MEDICAL HISTORY: As per history of present illness and as reviewed below 

otherwise noncontributory.





SURGICAL HISTORY: As per history of present illness and as reviewed below 

otherwise noncontributory.





SOCIAL HISTORY: As per history of present illness and as reviewed below 

otherwise noncontributory.





FAMILY HISTORY: As per history of present illness and as reviewed below 

otherwise noncontributory.








EXAMINATION OF ORGAN SYSTEMS/BODY AREAS: 





Constitutional: Blood pressure is 140/84, heart rate 92, respiratory rate 16 

with an oxygen saturation of 98% on room air.  Temperature 36.1


General: Overall well-appearing woman who is in no acute distress.


Psychiatric: Appropriate mood and affect.


Eyes: No scleral icterus or conjunctival erythema


ENMT: Moist mucous membranes. No pharyngeal erythema


Cardiovascular: Regular, rate, and rhythm.  No gallops, murmurs, or rubs.  

Bilateral upper extremity pulses symmetric and intact.  No peripheral edema.  No

JVD.


Respiratory: Lungs clear to auscultation bilaterally.  No wheezes, rales, or 

rhonchi.


Gastrointestinal: Soft, non-tender, non-distended.  Normoactive bowel sounds


Genitourinary: No suprapubic tenderness


Musculoskeletal: The patient has full range of motion of the left wrist and left

fingers.  There is mild swelling on the posterior aspect of the left wrist.  No 

warmth or redness noted.  With compression of the joint there was no 

exacerbation of pain.


Skin: No lesions or abrasions.


Neurological:     Alert, GCS 15 distal sensation is intact.  Strength 5 out of 5

in bilateral upper extremities.








MEDICAL DECISION MAKING AND COURSE IN THE ED WITH INTERPRETATION/REVIEW OF 

DIAGNOSTIC STUDIES: This is a 55-year-old with 1 with a past medical history of 

rheumatoid arthritis who comes to the emergency department with what appears to 

be an acute rheumatic flare of her left wrist which is similar to prior.  At 

this time we will provide the patient with Toradol and Decadron.  Given the 

patient is having an injection in approximately 1 week I did discuss with her 

follow-up.  I discussed that she should continue using her Tylenol arthritis, 

her home Celebrex, ice and elevate.  She is to return for any new or worsening 

symptoms.  She was amenable to discharge at this time and had no further 

questions.





DISPOSITION: The patient was discharged home in stable condition. The patient 

will follow up with her scheduled appointment next week





CONDITION: Fair





PROCEDURES: None





FINAL IMPRESSION(S)/DIAGNOSES: 





1.  Acute breast pain likely secondary to rheumatic flare





 





Davi Rey M.D.


  ** Left Hand


Pain Score (Numeric/FACES): 10





- Related Data


                                    Allergies











Allergy/AdvReac Type Severity Reaction Status Date / Time


 


erythromycin base Allergy  Hives Verified 06/15/21 07:48


 


latex Allergy  Rash Verified 06/15/21 07:48


 


Clear Medical Tape Allergy  Rash Uncoded 06/15/21 07:48











Home Meds: 


                                    Home Meds





Celecoxib [CeleBREX] 200 mg PO BEDTIME 01/14/17 [History]


Cyclobenzaprine [Flexeril] 10 mg PO BID 01/14/17 [History]


Omeprazole 20 mg PO BID 01/14/17 [History]


Pregabalin [Lyrica] 200 mg PO TID 01/14/17 [History]


amLODIPine [Norvasc] 10 mg PO BEDTIME 01/14/17 [History]











Past Medical History


HEENT History: Reports: None, Other (See Below)


Other HEENT History: wears contacts


Cardiovascular History: Reports: Hypertension, Other (See Below)


Other Cardiovascular History: pericarditis 2009


Respiratory History: Reports: None


Gastrointestinal History: Reports: GERD


Genitourinary History: Reports: None


OB/GYN History: Reports: None


Musculoskeletal History: Reports: Fibromyalgia, Osteoarthritis, RA


Neurological History: Reports: None


Psychiatric History: Reports: None


Endocrine/Metabolic History: Reports: None


Hematologic History: Reports: None


Immunologic History: Reports: None


Oncologic (Cancer) History: Reports: None


Dermatologic History: Reports: None


Other Dermatologic History: Raynaud's Syndrome





- Infectious Disease History


Infectious Disease History: Reports: Chicken Pox


Other Infectious Disease History: Rt foot sole





- Past Surgical History


Head Surgeries/Procedures: Reports: None


Cardiovascular Surgical History: Reports: None


Female  Surgical History: Reports: Hysterectomy, Other (See Below)


Other Female  Surgeries/Procedures: has left ovary


Neurological Surgical History: Reports: Other (See Below)


Other Neurological Surgeries/Procedures: Raynaud's Syndrome


Musculoskeletal Surgical History: Reports: Other (See Below)


Other Musculoskeletal Surgeries/Procedures:: Both wrist Sx.  Bilateral bunion sx


Dermatological Surgical History: Reports: None





Social & Family History





- Family History


Family Medical History: No Pertinent Family History





- Caffeine Use


Caffeine Use: Reports: Coffee





- Recreational Drug Use


Recreational Drug Use: No





- Living Situation & Occupation


Living situation: Reports: 


Occupation: Employed





Review of Systems





- Review of Systems


Review Of Systems: See Below





ED EXAM, GENERAL





- Physical Exam


Exam: See Below





Course





- Vital Signs


Last Recorded V/S: 


                                Last Vital Signs











Temp  36.1 C   06/15/21 07:49


 


Pulse  92   06/15/21 07:49


 


Resp  16   06/15/21 07:49


 


BP  112/65   06/15/21 08:37


 


Pulse Ox  98   06/15/21 07:49














- Orders/Labs/Meds


Meds: 


Medications














Discontinued Medications














Generic Name Dose Route Start Last Admin





  Trade Name Taz GUPTAN Reason Stop Dose Admin


 


Dexamethasone  6 mg  06/15/21 08:07  06/15/21 08:19





  Dexamethasone 4 Mg Tab  PO  06/15/21 08:08  6 mg





  ONETIME ONE   Administration


 


Ketorolac Tromethamine  15 mg  06/15/21 08:07  06/15/21 08:19





  Ketorolac 15 Mg/Ml Sdv  IM  06/15/21 08:08  15 mg





  ONETIME ONE   Administration














Departure





- Departure


Time of Disposition: 08:11


Disposition: Home, Self-Care 01


Condition: Fair


Clinical Impression: 


 Flare of rheumatoid arthritis








- Discharge Information


*PRESCRIPTION DRUG MONITORING PROGRAM REVIEWED*: No


*COPY OF PRESCRIPTION DRUG MONITORING REPORT IN PATIENT [place]INTAN: No


Instructions:  Arthritis, Easy-to-Read


Referrals: 


Facundo Arnold MD [Primary Care Provider] - 


Forms:  ED Department Discharge


Additional Instructions: 


You were evaluated today on an emergent basis.  At this time I do believe you 

are having a flare of your arthritis.  Given that you have an appointment in 1 

week with your rheumatologist for an injection I do recommend that you continue 

to use Tylenol arthritis as you have been taking, ice the affected area, elevate

the affected area and take your home Celebrex.  If you have any new or worsening

symptoms please return to the emergency department.  The symptoms include: 

Redness, fever, warmth, worsening pain.





[facility]                                              

 


[address] [place] [address]                                                             

               


Phone: [phone]                                                           

            


Fax: [phone]    





[place] [address]                                                             

               


Phone: [phone]                                                           

                                


 Fax: [phone]





The patient is informed of any results of their evaluation and diagnostic workup

and all questions are answered. They are given discharge instructions and return

precautions. The patient is stable for discharge.  The patient states they 

understand and agree with the plan and that they will return if their symptoms 

get worse or if they have any new concerns.





The following information is given to patients seen in the emergency department 

who are being discharged to home. This information is to outline your options 

for follow-up care. We provide all patients seen in our emergency department 

with a follow-up referral.





The need for follow-up, as well as the timing and circumstances, are variable 

depending upon the specifics of your emergency department visit.





If you don't have a primary care physician on staff, we will provide you with a 

referral. We always advise you to contact your personal physician following an 

emergency department visit to inform them of the circumstance of the visit and 

for follow-up with them and/or the need for any referrals to a consulting 

specialist.





The emergency department will also refer you to a specialist when appropriate. 

This referral assures that you have the opportunity for follow-up care with a 

specialist. All of these measure are taken in an effort to provide you with 

optimal care, which includes your follow-up.





Under all circumstances we always encourage you to contact your private 

physician who remains a resource for coordinating your care. When calling for 

follow-up care, please make the office aware that this follow-up is from your 

recent emergency room visit. If for any reason you are refused follow-up, please

contact the [facility] Emergency Department

at [phone] and asked to speak to the emergency department charge nurse.











Sepsis Event Note (ED)





- Evaluation


Sepsis Screening Result: No Definite Risk





- Focused Exam


Vital Signs: 


                                   Vital Signs











  Temp Pulse Resp BP Pulse Ox


 


 06/15/21 08:37     112/65 


 


 06/15/21 07:49  36.1 C  92  16  140/84  98